# Patient Record
Sex: FEMALE | Race: WHITE | NOT HISPANIC OR LATINO | Employment: FULL TIME
[De-identification: names, ages, dates, MRNs, and addresses within clinical notes are randomized per-mention and may not be internally consistent; named-entity substitution may affect disease eponyms.]

---

## 2023-08-02 ENCOUNTER — OFFICE VISIT (OUTPATIENT)
Dept: BEHAVIORAL HEALTH | Facility: PSYCHIATRIC FACILITY | Age: 38
End: 2023-08-02
Payer: COMMERCIAL

## 2023-08-02 VITALS
HEIGHT: 64 IN | OXYGEN SATURATION: 95 % | WEIGHT: 124 LBS | DIASTOLIC BLOOD PRESSURE: 82 MMHG | BODY MASS INDEX: 21.17 KG/M2 | HEART RATE: 94 BPM | SYSTOLIC BLOOD PRESSURE: 119 MMHG

## 2023-08-02 DIAGNOSIS — F19.94 SUBSTANCE INDUCED MOOD DISORDER (HCC): ICD-10-CM

## 2023-08-02 DIAGNOSIS — F19.10 POLYSUBSTANCE ABUSE (HCC): ICD-10-CM

## 2023-08-02 DIAGNOSIS — F41.1 GAD (GENERALIZED ANXIETY DISORDER): ICD-10-CM

## 2023-08-02 PROCEDURE — 3079F DIAST BP 80-89 MM HG: CPT | Performed by: STUDENT IN AN ORGANIZED HEALTH CARE EDUCATION/TRAINING PROGRAM

## 2023-08-02 PROCEDURE — 90792 PSYCH DIAG EVAL W/MED SRVCS: CPT | Mod: GC | Performed by: STUDENT IN AN ORGANIZED HEALTH CARE EDUCATION/TRAINING PROGRAM

## 2023-08-02 PROCEDURE — 3074F SYST BP LT 130 MM HG: CPT | Performed by: STUDENT IN AN ORGANIZED HEALTH CARE EDUCATION/TRAINING PROGRAM

## 2023-08-02 RX ORDER — GABAPENTIN 600 MG/1
600 TABLET ORAL 3 TIMES DAILY
Qty: 90 TABLET | Refills: 3 | Status: SHIPPED | OUTPATIENT
Start: 2023-08-02 | End: 2023-11-22

## 2023-08-02 RX ORDER — LURASIDONE HYDROCHLORIDE 40 MG/1
40 TABLET, FILM COATED ORAL
Qty: 30 TABLET | Refills: 3 | Status: SHIPPED | OUTPATIENT
Start: 2023-08-02 | End: 2023-09-20

## 2023-08-02 RX ORDER — HYDROXYZINE 50 MG/1
50 TABLET, FILM COATED ORAL
Qty: 30 TABLET | Refills: 3 | Status: SHIPPED | OUTPATIENT
Start: 2023-08-02 | End: 2023-09-01

## 2023-08-02 ASSESSMENT — ENCOUNTER SYMPTOMS
PARANOIA: 0
NERVOUS/ANXIOUS: 0
HYPERVIGILANCE: 0
WEAKNESS: 0
FEVER: 0
CHILLS: 0
DIFFICULTY FALLING ASLEEP: 0
PREOCCUPATION: 0
SHORTNESS OF BREATH: 0
PANIC: 0
AGGRESSION: 0
INSOMNIA: 0
HOPELESSNESS: 0
DELUSIONS: 0
VOMITING: 0
COMPULSIONS: 0
HEARTBURN: 0
OBSESSIVE COMPULSIVE BEHAVIOR: 0
DEPRESSION: 0
COUGH: 0
THOUGHT CONTENT - EXCESSIVE UNREASONABLE FEAR: 1
RECENT WEIGHT LOSS: 0
HALLUCINATIONS: 0
THOUGHT CONTENT - SHAME: 0
WEIGHT GAIN: 0
CONSTIPATION: 0
NAUSEA: 0
DIARRHEA: 0
DECREASED APPETITE: 0
RESTLESS SLEEP: 0
DECREASED ENERGY: 0

## 2023-08-02 ASSESSMENT — LIFESTYLE VARIABLES: SUBSTANCE_ABUSE: 0
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
## 2023-08-08 ENCOUNTER — OFFICE VISIT (OUTPATIENT)
Dept: URGENT CARE | Facility: CLINIC | Age: 38
End: 2023-08-08
Payer: COMMERCIAL

## 2023-08-08 ENCOUNTER — APPOINTMENT (OUTPATIENT)
Dept: URGENT CARE | Facility: CLINIC | Age: 38
End: 2023-08-08

## 2023-08-08 VITALS
RESPIRATION RATE: 20 BRPM | WEIGHT: 125 LBS | BODY MASS INDEX: 21.34 KG/M2 | TEMPERATURE: 98.6 F | HEIGHT: 64 IN | DIASTOLIC BLOOD PRESSURE: 72 MMHG | OXYGEN SATURATION: 98 % | HEART RATE: 96 BPM | SYSTOLIC BLOOD PRESSURE: 114 MMHG

## 2023-08-08 DIAGNOSIS — J98.01 BRONCHOSPASM, ACUTE: ICD-10-CM

## 2023-08-08 DIAGNOSIS — R05.1 ACUTE COUGH: ICD-10-CM

## 2023-08-08 PROCEDURE — 3078F DIAST BP <80 MM HG: CPT | Performed by: PHYSICIAN ASSISTANT

## 2023-08-08 PROCEDURE — 99203 OFFICE O/P NEW LOW 30 MIN: CPT | Performed by: PHYSICIAN ASSISTANT

## 2023-08-08 PROCEDURE — 3074F SYST BP LT 130 MM HG: CPT | Performed by: PHYSICIAN ASSISTANT

## 2023-08-08 RX ORDER — LEVONORGESTREL 13.5 MG/1
INTRAUTERINE DEVICE INTRAUTERINE
COMMUNITY

## 2023-08-08 RX ORDER — ALBUTEROL SULFATE 90 UG/1
2 AEROSOL, METERED RESPIRATORY (INHALATION) EVERY 6 HOURS PRN
Qty: 8.5 G | Refills: 0 | Status: SHIPPED | OUTPATIENT
Start: 2023-08-08

## 2023-08-08 RX ORDER — PREDNISONE 20 MG/1
40 TABLET ORAL DAILY
Qty: 10 TABLET | Refills: 0 | Status: SHIPPED | OUTPATIENT
Start: 2023-08-08 | End: 2023-08-13

## 2023-08-08 RX ORDER — ONDANSETRON 4 MG/1
TABLET, ORALLY DISINTEGRATING ORAL
COMMUNITY
Start: 2023-07-28

## 2023-08-08 RX ORDER — DEXTROMETHORPHAN HYDROBROMIDE AND PROMETHAZINE HYDROCHLORIDE 15; 6.25 MG/5ML; MG/5ML
5 SYRUP ORAL EVERY 4 HOURS PRN
Qty: 120 ML | Refills: 0 | Status: SHIPPED | OUTPATIENT
Start: 2023-08-08

## 2023-08-08 NOTE — PROGRESS NOTES
"Subjective:   Tonya Hillman is a 37 y.o. female who presents for Cough (X4 days, wheezy cough, SOB, took a Covid test yesterday: Negative)      HPI  The patient presents to the Urgent Care with complaints of cough and wheezing onset 4 days ago.  Sometimes productive.  No history of asthma.  Associated nasal congestion and mild shortness of breath.  Mucinex DM this morning without much difference.  Denies any fever, sore throat, vomiting, diarrhea.   Negative COVID test yesterday. Does not feel like COVID.   Patient recently moved here and new to the area last month.          History reviewed. No pertinent past medical history.  No Known Allergies     Objective:     /72 (BP Location: Left arm, Patient Position: Sitting, BP Cuff Size: Adult)   Pulse 96   Temp 37 °C (98.6 °F) (Temporal)   Resp 20   Ht 1.626 m (5' 4\")   Wt 56.7 kg (125 lb)   SpO2 98%   BMI 21.46 kg/m²     Physical Exam  Vitals reviewed.   Constitutional:       General: She is not in acute distress.     Appearance: Normal appearance. She is not ill-appearing or toxic-appearing.   HENT:      Mouth/Throat:      Mouth: Mucous membranes are moist.      Pharynx: Uvula midline. Posterior oropharyngeal erythema present. No pharyngeal swelling, oropharyngeal exudate or uvula swelling.      Tonsils: No tonsillar exudate or tonsillar abscesses.   Eyes:      Conjunctiva/sclera: Conjunctivae normal.   Cardiovascular:      Rate and Rhythm: Normal rate and regular rhythm.      Heart sounds: Normal heart sounds.   Pulmonary:      Effort: Pulmonary effort is normal. No respiratory distress.      Breath sounds: Wheezing (mild expiratory to upper lung fields.) present. No rhonchi or rales.   Musculoskeletal:      Cervical back: Neck supple. No rigidity.   Skin:     General: Skin is warm and dry.   Neurological:      General: No focal deficit present.      Mental Status: She is alert and oriented to person, place, and time.   Psychiatric:         " Mood and Affect: Mood normal.         Behavior: Behavior normal.         Diagnosis and associated orders:     1. Bronchospasm, acute    - albuterol 108 (90 Base) MCG/ACT Aero Soln inhalation aerosol; Inhale 2 Puffs every 6 hours as needed for Shortness of Breath.  Dispense: 8.5 g; Refill: 0  - predniSONE (DELTASONE) 20 MG Tab; Take 2 Tablets by mouth every day for 5 days.  Dispense: 10 Tablet; Refill: 0  - promethazine-dextromethorphan (PROMETHAZINE-DM) 6.25-15 MG/5ML syrup; Take 5 mL by mouth every four hours as needed for Cough.  Dispense: 120 mL; Refill: 0    2. Acute cough    - promethazine-dextromethorphan (PROMETHAZINE-DM) 6.25-15 MG/5ML syrup; Take 5 mL by mouth every four hours as needed for Cough.  Dispense: 120 mL; Refill: 0       Comments/MDM:     Patient's presenting symptoms and exam findings are consistent with acute bronchospasm or reactive airway secondary to viral versus seasonal allergies.  Denies history of asthma.  She is well-appearing no acute distress.  Lung examination showed mild expiratory wheezes to upper lung fields.  No crackles or rhonchi.  Normal vital signs.  Will start treatment of albuterol inhaler and prednisone.  Take prednisone in the morning.  Cough medicine during the night.  Over-the-counter cough medicine during the day such as Robitussin DM or Mucinex.  Increase fluid intake, nasal saline washes, Flonase nasal spray, antihistamine such as Zyrtec or Claritin.       I personally reviewed prior external notes and test results pertinent to today's visit. Pathogenesis of diagnosis discussed including typical length and natural progression. Supportive care, natural history, differential diagnoses, and indications for immediate follow-up discussed. Patient expresses understanding and agrees to plan. Patient denies any other questions or concerns.     Follow-up with the primary care physician for recheck, reevaluation, and consideration of further management.    Please note that  this dictation was created using voice recognition software. I have made a reasonable attempt to correct obvious errors, but I expect that there are errors of grammar and possibly content that I did not discover before finalizing the note.    This note was electronically signed by Jordan Singleton PA-C

## 2023-09-20 ENCOUNTER — OFFICE VISIT (OUTPATIENT)
Dept: BEHAVIORAL HEALTH | Facility: PSYCHIATRIC FACILITY | Age: 38
End: 2023-09-20
Payer: COMMERCIAL

## 2023-09-20 VITALS
HEART RATE: 57 BPM | OXYGEN SATURATION: 94 % | HEIGHT: 64 IN | BODY MASS INDEX: 21.68 KG/M2 | DIASTOLIC BLOOD PRESSURE: 71 MMHG | WEIGHT: 127 LBS | SYSTOLIC BLOOD PRESSURE: 107 MMHG

## 2023-09-20 DIAGNOSIS — F19.94 SUBSTANCE INDUCED MOOD DISORDER (HCC): ICD-10-CM

## 2023-09-20 PROCEDURE — 99214 OFFICE O/P EST MOD 30 MIN: CPT | Mod: GC | Performed by: STUDENT IN AN ORGANIZED HEALTH CARE EDUCATION/TRAINING PROGRAM

## 2023-09-20 PROCEDURE — 3074F SYST BP LT 130 MM HG: CPT | Performed by: STUDENT IN AN ORGANIZED HEALTH CARE EDUCATION/TRAINING PROGRAM

## 2023-09-20 PROCEDURE — 3078F DIAST BP <80 MM HG: CPT | Performed by: STUDENT IN AN ORGANIZED HEALTH CARE EDUCATION/TRAINING PROGRAM

## 2023-09-20 RX ORDER — LURASIDONE HYDROCHLORIDE 20 MG/1
TABLET, FILM COATED ORAL
Qty: 18 TABLET | Refills: 0 | Status: SHIPPED | OUTPATIENT
Start: 2023-09-20 | End: 2023-10-11

## 2023-09-20 ASSESSMENT — ENCOUNTER SYMPTOMS
OBSESSIVE COMPULSIVE BEHAVIOR: 0
PREOCCUPATION: 0
HALLUCINATIONS: 0
HEARTBURN: 0
HOPELESSNESS: 0
CONSTIPATION: 0
FEVER: 0
AGGRESSION: 0
COUGH: 0
CHILLS: 0
INSOMNIA: 0
THOUGHT CONTENT - SHAME: 0
RECENT WEIGHT LOSS: 0
THOUGHT CONTENT - EXCESSIVE UNREASONABLE FEAR: 1
NAUSEA: 0
DEPRESSION: 0
DECREASED ENERGY: 0
DIARRHEA: 0
WEAKNESS: 0
PANIC: 0
PARANOIA: 0
NERVOUS/ANXIOUS: 0
DECREASED APPETITE: 0
DIFFICULTY FALLING ASLEEP: 0
RESTLESS SLEEP: 0
DELUSIONS: 0
WEIGHT GAIN: 0
HYPERVIGILANCE: 0
COMPULSIONS: 0
SHORTNESS OF BREATH: 0
VOMITING: 0

## 2023-09-20 ASSESSMENT — LIFESTYLE VARIABLES: SUBSTANCE_ABUSE: 0
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
## 2023-11-22 ENCOUNTER — OFFICE VISIT (OUTPATIENT)
Dept: BEHAVIORAL HEALTH | Facility: PSYCHIATRIC FACILITY | Age: 38
End: 2023-11-22
Payer: COMMERCIAL

## 2023-11-22 DIAGNOSIS — F19.94 SUBSTANCE INDUCED MOOD DISORDER (HCC): ICD-10-CM

## 2023-11-22 DIAGNOSIS — F41.1 GENERALIZED ANXIETY DISORDER: ICD-10-CM

## 2023-11-22 DIAGNOSIS — F41.1 GAD (GENERALIZED ANXIETY DISORDER): ICD-10-CM

## 2023-11-22 DIAGNOSIS — F19.90 POLYSUBSTANCE USE DISORDER: ICD-10-CM

## 2023-11-22 PROCEDURE — 99214 OFFICE O/P EST MOD 30 MIN: CPT | Mod: GC | Performed by: STUDENT IN AN ORGANIZED HEALTH CARE EDUCATION/TRAINING PROGRAM

## 2023-11-22 RX ORDER — GABAPENTIN 600 MG/1
900 TABLET ORAL
Qty: 135 TABLET | Refills: 1 | Status: SHIPPED | OUTPATIENT
Start: 2023-11-22 | End: 2024-05-20

## 2023-11-22 RX ORDER — HYDROXYZINE 50 MG/1
50 TABLET, FILM COATED ORAL
Qty: 90 TABLET | Refills: 1 | Status: SHIPPED | OUTPATIENT
Start: 2023-11-22 | End: 2024-05-20

## 2023-11-22 ASSESSMENT — ANXIETY QUESTIONNAIRES
7. FEELING AFRAID AS IF SOMETHING AWFUL MIGHT HAPPEN: SEVERAL DAYS
1. FEELING NERVOUS, ANXIOUS, OR ON EDGE: MORE THAN HALF THE DAYS
2. NOT BEING ABLE TO STOP OR CONTROL WORRYING: SEVERAL DAYS
GAD7 TOTAL SCORE: 10
5. BEING SO RESTLESS THAT IT IS HARD TO SIT STILL: SEVERAL DAYS
IF YOU CHECKED OFF ANY PROBLEMS ON THIS QUESTIONNAIRE, HOW DIFFICULT HAVE THESE PROBLEMS MADE IT FOR YOU TO DO YOUR WORK, TAKE CARE OF THINGS AT HOME, OR GET ALONG WITH OTHER PEOPLE: SOMEWHAT DIFFICULT
6. BECOMING EASILY ANNOYED OR IRRITABLE: MORE THAN HALF THE DAYS
4. TROUBLE RELAXING: MORE THAN HALF THE DAYS
3. WORRYING TOO MUCH ABOUT DIFFERENT THINGS: SEVERAL DAYS

## 2023-11-22 ASSESSMENT — PATIENT HEALTH QUESTIONNAIRE - PHQ9
CLINICAL INTERPRETATION OF PHQ2 SCORE: 0
5. POOR APPETITE OR OVEREATING: 0 - NOT AT ALL

## 2023-11-22 ASSESSMENT — ENCOUNTER SYMPTOMS
FEVER: 0
HEADACHES: 0
COUGH: 0
DEPRESSION: 0
WEAKNESS: 0
CHILLS: 0
HALLUCINATIONS: 0
NAUSEA: 0
NERVOUS/ANXIOUS: 0
DIZZINESS: 0
INSOMNIA: 0
SEIZURES: 0
VOMITING: 0

## 2023-11-22 ASSESSMENT — LIFESTYLE VARIABLES: SUBSTANCE_ABUSE: 0

## 2023-11-22 NOTE — PROGRESS NOTES
Evaluation completed by: Danay Choudhary D.O.   Date of Service: 11/22/23   Appointment type: in-office appointment.  Attending:  Latisha Rodríguez M.D.  Information below was collected from: patient    CHIEF COMPLIANT:  Medication Management (For Substance induced mood disorder, stable, Generalized anxiety disorder, stable, Polysubstance use disorder, severe, in partial remission, hx Postpartum psychotic sx, resolved )        HPI:   Tonya Hilmlan is a 38 y.o. old female who presents today for regularly scheduled follow up for assessment of Medication Management (For Substance induced mood disorder, stable, Generalized anxiety disorder, stable, Polysubstance use disorder, severe, in partial remission, hx Postpartum psychotic sx, resolved )    Pt's last visit was on 9/20/2023 at which time plan was to:  -DECREASE latuda 40mg to 20mg QPM with dinner with plan to d/c  -Continue hydroxyzine 50mg daily PRN  -Continue gabapentin 600mg TID PO for anxiety     Since last visit, pt has d/c'd latuda without issue or adverse effect.   She has also decreased the gabapentin she is on from 600mg TID to 900mg QHS. She has noticed some internal irritability arise since decreasing the medications. States she had one episode of increased anxiety on the plane, and recalls an odd thought of being worried about [place] a [place] but had no plans to go to a lake, but otherwise no other paranoia or anxiety.     Has been getting 7-9 hours of sleep/night, mood has otherwise been normal. Has been utilizing gabapentin and hydroxyzine prn qhs to assist with sleep.    States that she was on lexapro before had potential equivocal effect, wellbutrin didn't help. Spoke of adderall and vyvanse prior misuse and marijuana use. Spoke of different ADHD tx, including non stimulant and ritalin potential medications.   She has no plans of getting pregnant and continue using IUD, she will keep us updated if anything  changes.        CURRENT MEDICATIONS    Current Outpatient Medications:     hydrOXYzine HCl (ATARAX) 50 MG Tab, Take 1 Tablet by mouth at bedtime for 180 days., Disp: 90 Tablet, Rfl: 1    gabapentin (NEURONTIN) 600 MG tablet, Take 1.5 Tablets by mouth at bedtime for 180 days., Disp: 135 Tablet, Rfl: 1    tretinoin (RETIN-A) 0.1 % cream, APPLY A THIN LAYER TO THE AFFECTED AREA(S) NIGHTLY AT BEDTIME. START WITH EVERY OTHER NIGHT AND INCREASE TO NIGHTLY AS TOLERATED, Disp: , Rfl:     ondansetron (ZOFRAN ODT) 4 MG TABLET DISPERSIBLE, 1 TABLET 3 TIMES A DAY FOR 2 DAYS OR UNTIL DIRECTED TO STOP. TAKE ONLY AS NEEDED., Disp: , Rfl:     multivitamin Tab, Take 1 Tablet by mouth every day., Disp: , Rfl:     Levonorgestrel (LEWIS) 13.5 MG IUD, by Intrauterine route., Disp: , Rfl:     albuterol 108 (90 Base) MCG/ACT Aero Soln inhalation aerosol, Inhale 2 Puffs every 6 hours as needed for Shortness of Breath., Disp: 8.5 g, Rfl: 0    promethazine-dextromethorphan (PROMETHAZINE-DM) 6.25-15 MG/5ML syrup, Take 5 mL by mouth every four hours as needed for Cough., Disp: 120 mL, Rfl: 0    REVIEW OF SYSTEMS   Review of Systems   Constitutional:  Negative for chills and fever.   Respiratory:  Negative for cough.    Cardiovascular:  Negative for chest pain.   Gastrointestinal:  Negative for nausea and vomiting.   Neurological:  Negative for dizziness, seizures, weakness and headaches.   Psychiatric/Behavioral:  Negative for depression, hallucinations, substance abuse and suicidal ideas. The patient is not nervous/anxious and does not have insomnia.        PAST MEDICAL HISTORY  No past medical history on file.  No Known Allergies  No past surgical history on file.     PSYCHIATRIC EXAMINATION   Vitals: There were no vitals taken for this visit.  Musculoskeletal: No abnormal movements noted and wnl  Appearance: well-developed, well-nourished, appears stated age, fair hygiene, no apparent distress, thin, and appropriately dressed, cooperative,  "engaged, friendly, and pleasant  Thought Process:  linear, coherent, goal-oriented, and organized  Abnormal or Psychotic Thoughts: Denies SI, denies HI, and no overt delusions noted and No overt delusions noted  Speech: regular rate, rhythm, volume, tone, and syntax, coherent, and normal tone  Mood: \"good\"  Affect: euphoric and congruent with mood  SI/HI: Denies SI and HI  Orientation: alert and oriented  Recent and Remote Memory: no gross impairment in immediate, recent, or remote memory  Attention Span and Concentration:  Insight/Judgement into symptoms: good  Neurological Testing (MSSE Score and/or clock drawing): MMSE not performed during this encounter      SCREENINGS:      11/22/2023     9:00 AM   Depression Screen (PHQ-2/PHQ-9)   PHQ-2 Total Score 0         11/22/2023    10:11 AM    EVELYN-7 ANXIETY SCALE FLOWSHEET   Feeling nervous, anxious, or on edge 2   Not being able to stop or control worrying 1   Worrying too much about different things 1   Trouble relaxing 2   Being so restless that it is hard to sit still 1   Becoming easily annoyed or irritable 2   Feeling afraid as if something awful might happen 1   EVELYN-7 Total Score 10   How difficult have these problems made it for you to do your work, take care of things at home, or get along with other people? Somewhat difficult       LABS:  No results found for: \"CHOLSTRLTOT\", \"TRIGLYCERIDE\", \"HDL\", \"LDL\"  No results found for: \"SODIUM\", \"POTASSIUM\", \"CHLORIDE\", \"CO2\", \"ANION\", \"GLUCOSE\", \"BUN\", \"CREATININE\", \"CALCIUM\", \"ASTSGOT\", \"ALTSGPT\", \"TBILIRUBIN\", \"ALBUMIN\", \"TOTPROTEIN\", \"GLOBULIN\", \"AGRATIO\"  Lab Results   Component Value Date/Time    WBC 8.0 10/24/2022 10:16 AM    HEMOGLOBIN 13.7 10/24/2022 10:16 AM    HEMATOCRIT 39.4 10/24/2022 10:16 AM    MCV 93 10/24/2022 10:16 AM    MCH 32.2 10/24/2022 10:16 AM    MCHC 34.8 10/24/2022 10:16 AM    RDW 12.8 10/24/2022 10:16 AM    PLATELETCT 280 10/24/2022 10:16 AM    NEUTS 4.8 10/24/2022 10:16 AM    MONOS 0.6 " "10/24/2022 10:16 AM    EOS 0.1 10/24/2022 10:16 AM    BASO 0.0 10/24/2022 10:16 AM    NRBCAB 0.0 10/24/2022 10:16 AM     Lab Results   Component Value Date/Time    HBA1C 5.1 10/24/2022 1016    AVGLUC 100 10/24/2022 1016     No results found for: \"TSHULTRASEN\"  No results found for: \"FREET4\"  No results found for: \"25HYDROXY\"    PREVENTATIVE CARE         ASSESSMENT  Tonya Hillman is a 38 y.o. old female who presents today for regularly scheduled follow up for assessment of Medication Management (For Substance induced mood disorder, stable, Generalized anxiety disorder, stable, Polysubstance use disorder, severe, in partial remission, hx Postpartum psychotic sx, resolved )     Can consider stimulant for hx of ADHD, mood stabilizer/antidepressant/antianxiety or other medication class if irritability/anxiety worsens/persists. Pt wishes to be mindful of her substance use hx throughout.     Pt will be establishing with primary care, instructed to get thyroid panel in addition to other labs they will order.    [place]  records   reviewed.  No concerns about misuse of controlled substance.    CURRENT RISK ASSESSMENT       Suicide: Low       Homicide: Low       Self-Harm: Low       Relapse: Low       Crisis Safety Plan Reviewed Not Indicated    DIAGNOSES/PLAN  Problem List Items Addressed This Visit    None  Visit Diagnoses       Substance induced mood disorder (HCC)        -DISCONTINUED latuda    Generalized anxiety disorder        -Continue hydroxyzine 50mg daily PRN  -DECREASE gabapentin 600mg TID PO to 900mg QHS for anxiety (pt self decreased)    Relevant Medications    hydrOXYzine HCl (ATARAX) 50 MG Tab    Polysubstance use disorder        sevjeromere, in partial remission  -Continue AA, working with sponsor    EVELYN (generalized anxiety disorder)        Relevant Medications    hydrOXYzine HCl (ATARAX) 50 MG Tab    gabapentin (NEURONTIN) 600 MG tablet               Medication options, alternatives (including no " medications) and medication risks/benefits/side effects were discussed in detail.  The patient was advised to call, message clinician on [facility], or come in to the clinic if symptoms worsen or if questions/issues regarding their medications arise.  The patient verbalized understanding and agreement.    The patient was educated to call 911, call the suicide hotline, or go to the local ER if having thoughts of suicide or homicide.  The patient verbalized understanding and agreement.   The proposed treatment plan was discussed with the patient who was provided the opportunity to ask questions and make suggestions regarding alternative treatment. Patient verbalized understanding and expressed agreement with the plan.      Return in about 4 weeks (around 12/20/2023).      This appointment was supervised by attending psychiatrist, Latisha Rodríguez M.D., who agrees with assessment and treatment plan.  See attending attestation for more details.

## 2024-01-10 ENCOUNTER — OFFICE VISIT (OUTPATIENT)
Dept: BEHAVIORAL HEALTH | Facility: PSYCHIATRIC FACILITY | Age: 39
End: 2024-01-10
Payer: COMMERCIAL

## 2024-01-10 VITALS
HEART RATE: 66 BPM | DIASTOLIC BLOOD PRESSURE: 62 MMHG | WEIGHT: 122.4 LBS | HEIGHT: 64 IN | SYSTOLIC BLOOD PRESSURE: 110 MMHG | BODY MASS INDEX: 20.9 KG/M2 | OXYGEN SATURATION: 93 %

## 2024-01-10 DIAGNOSIS — F19.94 SUBSTANCE INDUCED MOOD DISORDER (HCC): ICD-10-CM

## 2024-01-10 DIAGNOSIS — F19.90 POLYSUBSTANCE USE DISORDER: ICD-10-CM

## 2024-01-10 DIAGNOSIS — F41.1 GENERALIZED ANXIETY DISORDER: ICD-10-CM

## 2024-01-10 DIAGNOSIS — F90.0 ADHD (ATTENTION DEFICIT HYPERACTIVITY DISORDER), INATTENTIVE TYPE: ICD-10-CM

## 2024-01-10 PROCEDURE — 99214 OFFICE O/P EST MOD 30 MIN: CPT | Mod: GC | Performed by: STUDENT IN AN ORGANIZED HEALTH CARE EDUCATION/TRAINING PROGRAM

## 2024-01-10 PROCEDURE — 3078F DIAST BP <80 MM HG: CPT | Performed by: STUDENT IN AN ORGANIZED HEALTH CARE EDUCATION/TRAINING PROGRAM

## 2024-01-10 PROCEDURE — 3074F SYST BP LT 130 MM HG: CPT | Performed by: STUDENT IN AN ORGANIZED HEALTH CARE EDUCATION/TRAINING PROGRAM

## 2024-01-10 RX ORDER — METHYLPHENIDATE HYDROCHLORIDE 20 MG/1
20 CAPSULE ORAL
Qty: 30 CAPSULE | Refills: 0 | Status: SHIPPED | OUTPATIENT
Start: 2024-01-10 | End: 2024-02-09

## 2024-01-10 ASSESSMENT — ENCOUNTER SYMPTOMS
SEIZURES: 0
VOMITING: 0
INSOMNIA: 0
HEADACHES: 0
DEPRESSION: 0
COUGH: 0
DIZZINESS: 0
HALLUCINATIONS: 0
CHILLS: 0
FEVER: 0
NERVOUS/ANXIOUS: 0
WEAKNESS: 0
NAUSEA: 0

## 2024-01-10 ASSESSMENT — LIFESTYLE VARIABLES: SUBSTANCE_ABUSE: 0

## 2024-01-10 NOTE — PROGRESS NOTES
Evaluation completed by: Danay Choudhary D.O.   Date of Service: 01/10/2024  Appointment type: in-office appointment.  Attending:  Latisha Rodríguez M.D.  Information below was collected from: patient    CHIEF COMPLIANT:  Medication Management      HPI:   Tonya Hillman is a 38 y.o. old female who presents today for regularly scheduled follow up for assessment of Medication Management    Pt's last visit was on 11/22/23 at which time plan was to:  -DISCONTINUED latuda   -Continue hydroxyzine 50mg daily PRN   -DECREASE gabapentin 600mg TID PO to 900mg QHS for anxiety (pt self decreased)   -Continue AA, working with sponsor  -Continue therapy    Pt reports being stable however continued difficulty with inattention, namely struggling to stay on task with fears of being potentially terminated. Reports that she is taking medications as prescribed, has discontinued latuda and decrease of gabapentin without adverse or notable effect (other than positive reflection not being sedated during the day). Denies SI, HI, AVH, paranoia, irritability. Reports good mood, denies sleep disturbances, denies substances/alcohol cravings. Continues to go to AA and therapy with good effect.     Patient is interested in starting ADHD medication as we have spoken about in previous appointments. Reviewed childhood history and robust adult coping mechanisms to allow her to compensate for ADHD symptoms of mostly inattentive nature. She has experienced numerous adverse events due to ADHD sx such as being passed up for a job, forgetting to pay bills, and having that impact her credit. 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     Reviewed patient's substance abuse hx and she re-iterates that she does not wish to go on IR nor Vyvanse because of abuse in on those medications in the past. Discussed several medication options, including  long acting medications. Attending also came in to speak with her about medication options and reviewed Jornay at length. Addressed patient's worry of previous theresa-like episodes which were specifically linked substances of hallucinogens/marijuana, patient is not engaging in any substances and reviewed again emergency options and ways of contacting the clinic in addition to further education about concerning symptoms that she can observe herself for if they arise.     Previously discussed very brief time in which she was post-partum and experienced some psychotic sx; she has no plans of getting pregnant and continue using IUD, she will keep us updated if anything changes.    CURRENT MEDICATIONS    Current Outpatient Medications:     hydrOXYzine HCl (ATARAX) 50 MG Tab, Take 1 Tablet by mouth at bedtime for 180 days., Disp: 90 Tablet, Rfl: 1    gabapentin (NEURONTIN) 600 MG tablet, Take 1.5 Tablets by mouth at bedtime for 180 days., Disp: 135 Tablet, Rfl: 1    tretinoin (RETIN-A) 0.1 % cream, APPLY A THIN LAYER TO THE AFFECTED AREA(S) NIGHTLY AT BEDTIME. START WITH EVERY OTHER NIGHT AND INCREASE TO NIGHTLY AS TOLERATED, Disp: , Rfl:     ondansetron (ZOFRAN ODT) 4 MG TABLET DISPERSIBLE, 1 TABLET 3 TIMES A DAY FOR 2 DAYS OR UNTIL DIRECTED TO STOP. TAKE ONLY AS NEEDED., Disp: , Rfl:     multivitamin Tab, Take 1 Tablet by mouth every day., Disp: , Rfl:     Levonorgestrel (LEWIS) 13.5 MG IUD, by Intrauterine route., Disp: , Rfl:     albuterol 108 (90 Base) MCG/ACT Aero Soln inhalation aerosol, Inhale 2 Puffs every 6 hours as needed for Shortness of Breath., Disp: 8.5 g, Rfl: 0    promethazine-dextromethorphan (PROMETHAZINE-DM) 6.25-15 MG/5ML syrup, Take 5 mL by mouth every four hours as needed for Cough., Disp: 120 mL, Rfl: 0    REVIEW OF SYSTEMS   Review of Systems   Constitutional:  Negative for chills and fever.   Respiratory:  Negative for cough.    Cardiovascular:  Negative for chest pain.   Gastrointestinal:   "Negative for nausea and vomiting.   Neurological:  Negative for dizziness, seizures, weakness and headaches.   Psychiatric/Behavioral:  Negative for depression, hallucinations, substance abuse and suicidal ideas. The patient is not nervous/anxious and does not have insomnia.        PAST MEDICAL HISTORY  No past medical history on file.  No Known Allergies  No past surgical history on file.     PSYCHIATRIC EXAMINATION   Vitals: /62 (BP Location: Right arm, Patient Position: Sitting, BP Cuff Size: Adult)   Pulse 66   Ht 1.626 m (5' 4\")   Wt 55.5 kg (122 lb 6.4 oz)   SpO2 93%   BMI 21.01 kg/m²   Musculoskeletal: No abnormal movements noted and wnl  Appearance: well-developed, well-nourished, appears stated age, fair hygiene, no apparent distress, thin, and appropriately dressed, cooperative, engaged, friendly, and pleasant  Thought Process:  linear, coherent, goal-oriented, and organized  Abnormal or Psychotic Thoughts: Denies SI, denies HI, and no overt delusions noted and No overt delusions noted  Speech: regular rate, rhythm, volume, tone, and syntax, coherent, and normal tone  Mood: \"good\"  Affect: euphoric and congruent with mood  SI/HI: Denies SI and HI  Orientation: alert and oriented  Recent and Remote Memory: no gross impairment in immediate, recent, or remote memory  Attention Span and Concentration:  Insight/Judgement into symptoms: good  Neurological Testing (MSSE Score and/or clock drawing): MMSE not performed during this encounter      SCREENINGS:      11/22/2023     9:00 AM   Depression Screen (PHQ-2/PHQ-9)   PHQ-2 Total Score 0         11/22/2023    10:11 AM    EVELYN-7 ANXIETY SCALE FLOWSHEET   Feeling nervous, anxious, or on edge 2   Not being able to stop or control worrying 1   Worrying too much about different things 1   Trouble relaxing 2   Being so restless that it is hard to sit still 1   Becoming easily annoyed or irritable 2   Feeling afraid as if something awful might happen 1 " "  EVELYN-7 Total Score 10   How difficult have these problems made it for you to do your work, take care of things at home, or get along with other people? Somewhat difficult       LABS:  No results found for: \"CHOLSTRLTOT\", \"TRIGLYCERIDE\", \"HDL\", \"LDL\"  No results found for: \"SODIUM\", \"POTASSIUM\", \"CHLORIDE\", \"CO2\", \"ANION\", \"GLUCOSE\", \"BUN\", \"CREATININE\", \"CALCIUM\", \"ASTSGOT\", \"ALTSGPT\", \"TBILIRUBIN\", \"ALBUMIN\", \"TOTPROTEIN\", \"GLOBULIN\", \"AGRATIO\"  Lab Results   Component Value Date/Time    WBC 8.0 10/24/2022 10:16 AM    HEMOGLOBIN 13.7 10/24/2022 10:16 AM    HEMATOCRIT 39.4 10/24/2022 10:16 AM    MCV 93 10/24/2022 10:16 AM    MCH 32.2 10/24/2022 10:16 AM    MCHC 34.8 10/24/2022 10:16 AM    RDW 12.8 10/24/2022 10:16 AM    PLATELETCT 280 10/24/2022 10:16 AM    NEUTS 4.8 10/24/2022 10:16 AM    MONOS 0.6 10/24/2022 10:16 AM    EOS 0.1 10/24/2022 10:16 AM    BASO 0.0 10/24/2022 10:16 AM    NRBCAB 0.0 10/24/2022 10:16 AM     Lab Results   Component Value Date/Time    HBA1C 5.1 10/24/2022 1016    AVGLUC 100 10/24/2022 1016     No results found for: \"TSHULTRASEN\"  No results found for: \"FREET4\"  No results found for: \"25HYDROXY\"    PREVENTATIVE CARE   N/A      ASSESSMENT  Tonya Hillman is a 38 y.o. old female who presents today for regularly scheduled follow up for assessment of Medication Management     [place]  records   reviewed.  No concerns about misuse of controlled substance.    CURRENT RISK ASSESSMENT       Suicide: Low       Homicide: Low       Self-Harm: Low       Relapse: Low       Crisis Safety Plan Reviewed Not Indicated    DIAGNOSES/PLAN  Problem List Items Addressed This Visit          Psychiatry Problems    Substance induced mood disorder (HCC)     Hx of postpartum psychotic sx (Pt has IUD)      Stable. Pt is doing well after tapering off latuda in conjunction with decreasing gabapentin.          Generalized anxiety disorder     In future visits depending on patient presentation can continue " tapering gabapentin due to potential lack of efficacy.     -Continue gabapentin 900mg QHS  -Continue hydroxyzine 50mg QHS         ADHD (attention deficit hyperactivity disorder), inattentive type     Patient has significant, debilitating ADHD symptoms of inattentive nature that she has been previously diagnosed and treated for prior that she would like to be medically addressed. Due to her severe history of polysubstance abuse leading to substance induced mood episodes and psychotic symptoms that have resulted in multiple inpatient psychiatric hospitalizations, patient cannot take any immediate release stimulants and cannot take Vyvanse due to history of abuse and abuse potential.   After discussing at length with patient regarding complex history of substance induced mood disorder, substance abuse history, medication options, side effects, and options to reach out for help including emergency and clinic numbers/my chart messages, will be starting Jornay trial today.     -START Jornay 20mg QHS for ADHD            Other    Polysubstance use disorder     In early remission, stable    Continue to encourage patient's attendance with AA and seeking preventative help               Medication options, alternatives (including no medications) and medication risks/benefits/side effects were discussed in detail.  The patient was advised to call, message clinician on [facility], or come in to the clinic if symptoms worsen or if questions/issues regarding their medications arise.  The patient verbalized understanding and agreement.    The patient was educated to call 911, call the suicide hotline, or go to the local ER if having thoughts of suicide or homicide.  The patient verbalized understanding and agreement.   The proposed treatment plan was discussed with the patient who was provided the opportunity to ask questions and make suggestions regarding alternative treatment. Patient verbalized understanding and expressed agreement  with the plan.      Return in about 4 weeks (around 2/7/2024).      This appointment was supervised by attending psychiatrist, Latisha Rodríguez M.D., who agrees with assessment and treatment plan.  See attending attestation for more details.

## 2024-01-10 NOTE — ASSESSMENT & PLAN NOTE
In early remission, stable    Continue to encourage patient's attendance with AA and seeking preventative help

## 2024-01-10 NOTE — ASSESSMENT & PLAN NOTE
Patient has significant, debilitating ADHD symptoms of inattentive nature that she has been previously diagnosed and treated for prior that she would like to be medically addressed. Due to her severe history of polysubstance abuse leading to substance induced mood episodes and psychotic symptoms that have resulted in multiple inpatient psychiatric hospitalizations, patient cannot take any immediate release stimulants and cannot take Vyvanse due to history of abuse and abuse potential.   After discussing at length with patient regarding complex history of substance induced mood disorder, substance abuse history, medication options, side effects, and options to reach out for help including emergency and clinic numbers/my chart messages, will be starting Jornay trial today.     -START Jornay 20mg QHS for ADHD

## 2024-01-10 NOTE — ASSESSMENT & PLAN NOTE
Hx of postpartum psychotic sx (Pt has IUD)      Stable. Pt is doing well after tapering off latuda in conjunction with decreasing gabapentin.

## 2024-01-10 NOTE — ASSESSMENT & PLAN NOTE
In future visits depending on patient presentation can continue tapering gabapentin due to potential lack of efficacy.     -Continue gabapentin 900mg QHS  -Continue hydroxyzine 50mg QHS

## 2024-02-07 ENCOUNTER — OFFICE VISIT (OUTPATIENT)
Dept: BEHAVIORAL HEALTH | Facility: PSYCHIATRIC FACILITY | Age: 39
End: 2024-02-07
Payer: COMMERCIAL

## 2024-02-07 VITALS
WEIGHT: 126 LBS | OXYGEN SATURATION: 96 % | SYSTOLIC BLOOD PRESSURE: 113 MMHG | HEIGHT: 64 IN | BODY MASS INDEX: 21.51 KG/M2 | DIASTOLIC BLOOD PRESSURE: 68 MMHG | HEART RATE: 80 BPM

## 2024-02-07 DIAGNOSIS — F41.1 GENERALIZED ANXIETY DISORDER: ICD-10-CM

## 2024-02-07 DIAGNOSIS — F19.94 SUBSTANCE INDUCED MOOD DISORDER (HCC): ICD-10-CM

## 2024-02-07 DIAGNOSIS — F90.0 ADHD (ATTENTION DEFICIT HYPERACTIVITY DISORDER), INATTENTIVE TYPE: ICD-10-CM

## 2024-02-07 PROCEDURE — 3078F DIAST BP <80 MM HG: CPT | Performed by: STUDENT IN AN ORGANIZED HEALTH CARE EDUCATION/TRAINING PROGRAM

## 2024-02-07 PROCEDURE — 3074F SYST BP LT 130 MM HG: CPT | Performed by: STUDENT IN AN ORGANIZED HEALTH CARE EDUCATION/TRAINING PROGRAM

## 2024-02-07 PROCEDURE — 99213 OFFICE O/P EST LOW 20 MIN: CPT | Mod: GE | Performed by: STUDENT IN AN ORGANIZED HEALTH CARE EDUCATION/TRAINING PROGRAM

## 2024-02-07 RX ORDER — METHYLPHENIDATE HYDROCHLORIDE 20 MG/1
20 CAPSULE ORAL DAILY
Qty: 30 CAPSULE | Refills: 0 | Status: SHIPPED | OUTPATIENT
Start: 2024-02-28 | End: 2024-03-13

## 2024-02-07 RX ORDER — METHYLPHENIDATE HYDROCHLORIDE 20 MG/1
20 CAPSULE ORAL DAILY
Qty: 30 CAPSULE | Refills: 0 | Status: SHIPPED | OUTPATIENT
Start: 2024-02-07 | End: 2024-03-08

## 2024-02-07 ASSESSMENT — ENCOUNTER SYMPTOMS
PALPITATIONS: 0
FEVER: 0
HEADACHES: 0
VOMITING: 0
CHILLS: 0
HALLUCINATIONS: 0
DIZZINESS: 0
DEPRESSION: 0
NAUSEA: 0
NERVOUS/ANXIOUS: 0
INSOMNIA: 0

## 2024-02-07 ASSESSMENT — LIFESTYLE VARIABLES: SUBSTANCE_ABUSE: 0

## 2024-02-07 NOTE — PROGRESS NOTES
"Evaluation completed by: Danay Choudhary D.O.   Date of Service: 02/07/2024  Appointment type: in-office appointment  Attending:  Dr. Latisha Rodríguez M.D.  Information below was collected from: Patient    CHIEF COMPLIANT:  Medication Management      HPI:   Tonya Hillman is a 38 y.o. old female who presents today for regularly scheduled follow up for assessment of Medication Management       Pt's last visit was on 01/10/2024 at which time plan was to:  -Continue gabapentin 900mg QHS  -Continue hydroxyzine 50mg QHS  -START Jornay 20mg QHS for ADHD       Pt reports she has had good effect with starting Jornay has of 2.5 weeks ago. She reports her inattentive symptoms decreased as she has been able to do well paying attention at work for ~2 hours while in the past she would not be able to sit at her computer, in addition to finding that it is easier to take notes after meetings and be present.  Denies medication side effects, denies sleep or appetite disturbances nor feelings of euphoria.       Reports that she has accidentally been taking her latuda for potentially the last 2 weeks due to it being the same pill bottle type and the pills looking \"exactly the same\" - discussed risks, benefits to d/c the latuda as she has not noticed any positive or negative symptoms since accidentally restarting that and further discussed safe guards such as the hydroxyzine and gabapentin in addition to stopping Jornay in addition to having this clinic as support in case she does become dysregulated.     Has been doing better at work and slowly gaining more confidence, no other interpersonal/social issues at this time.      PSYCHIATRIC REVIEW OF SYSTEMS: current symptoms as reported by patient  Depression: DENIES overt symptoms of depression such as marked depressed mood, anhedonia, sleep disturbances, Anhedonia, Low energy, Low appetite, and Suicidal ideation  Theresa: Denies overt symptoms of theresa such as " "marked elevated mood, distractability, irritability, grandiosity, flight of ideas   Anxiety/Panic Attacks: Denies overt symptoms of anxiety such as increased restlessness, panic attacks   PTSD symptom:  Patient reports no signs or symptoms indicative of PTSD   Psychosis: Patient reports no signs or symptoms indicative of psychosis    REVIEW OF SYSTEMS   Review of Systems   Constitutional:  Negative for chills and fever.   Cardiovascular:  Negative for chest pain and palpitations.   Gastrointestinal:  Negative for nausea and vomiting.   Neurological:  Negative for dizziness and headaches.   Psychiatric/Behavioral:  Negative for depression, hallucinations, substance abuse and suicidal ideas. The patient is not nervous/anxious and does not have insomnia.          PSYCHIATRIC EXAMINATION   Musculoskeletal: No abnormal movements noted and wnl  Appearance:  well-developed, well-nourished, appears stated age, fair hygiene, no apparent distress, and appropriately dressed, cooperative, engaged, friendly, and pleasant  Thought Process:   linear, coherent, goal-oriented, and organized  Abnormal or Psychotic Thoughts: Denies SI, denies HI, and no overt delusions noted and No overt delusions noted  SI/HI: Denies SI and HI  Speech: regular rate, rhythm, volume, tone, and syntax, coherent, and spontaneous  Mood: \"Good! Great!\"  Affect: euthymic to bright and congruent with mood  Orientation: alert and oriented  Recent and Remote Memory: no gross impairment in immediate, recent, or remote memory  Attention Span and Concentration: Intact  Insight/Judgement into symptoms: good  Neurological Testing (MSSE Score and/or clock drawing): MMSE not performed during this encounter  /68 (BP Location: Right arm, Patient Position: Sitting, BP Cuff Size: Adult)   Pulse 80   Ht 1.626 m (5' 4\")   Wt 57.2 kg (126 lb)   SpO2 96%   BMI 21.63 kg/m²     CURRENT MEDICATIONS    Current Outpatient Medications:     [START ON 2/28/2024] " Methylphenidate HCl ER, PM, (JORNAY PM) 20 MG CAPSULE SR 24 HR, Take 20 mg by mouth every day for 30 days., Disp: 30 Capsule, Rfl: 0    Methylphenidate HCl ER, PM, (JORNAY PM) 20 MG CAPSULE SR 24 HR, Take 20 mg by mouth every day for 30 days., Disp: 30 Capsule, Rfl: 0    Methylphenidate HCl ER, PM, (JORNAY PM) 20 MG CAPSULE SR 24 HR, Take 20 mg by mouth at bedtime for 30 days., Disp: 30 Capsule, Rfl: 0    hydrOXYzine HCl (ATARAX) 50 MG Tab, Take 1 Tablet by mouth at bedtime for 180 days., Disp: 90 Tablet, Rfl: 1    gabapentin (NEURONTIN) 600 MG tablet, Take 1.5 Tablets by mouth at bedtime for 180 days., Disp: 135 Tablet, Rfl: 1    tretinoin (RETIN-A) 0.1 % cream, APPLY A THIN LAYER TO THE AFFECTED AREA(S) NIGHTLY AT BEDTIME. START WITH EVERY OTHER NIGHT AND INCREASE TO NIGHTLY AS TOLERATED, Disp: , Rfl:     ondansetron (ZOFRAN ODT) 4 MG TABLET DISPERSIBLE, 1 TABLET 3 TIMES A DAY FOR 2 DAYS OR UNTIL DIRECTED TO STOP. TAKE ONLY AS NEEDED., Disp: , Rfl:     multivitamin Tab, Take 1 Tablet by mouth every day., Disp: , Rfl:     Levonorgestrel (LEWIS) 13.5 MG IUD, by Intrauterine route., Disp: , Rfl:     albuterol 108 (90 Base) MCG/ACT Aero Soln inhalation aerosol, Inhale 2 Puffs every 6 hours as needed for Shortness of Breath., Disp: 8.5 g, Rfl: 0    promethazine-dextromethorphan (PROMETHAZINE-DM) 6.25-15 MG/5ML syrup, Take 5 mL by mouth every four hours as needed for Cough., Disp: 120 mL, Rfl: 0    PAST MEDICAL HISTORY  No past medical history on file.  No Known Allergies  No past surgical history on file.     SCREENINGS:      11/22/2023     9:00 AM   Depression Screen (PHQ-2/PHQ-9)   PHQ-2 Total Score 0         11/22/2023    10:11 AM    EVELYN-7 ANXIETY SCALE FLOWSHEET   Feeling nervous, anxious, or on edge 2   Not being able to stop or control worrying 1   Worrying too much about different things 1   Trouble relaxing 2   Being so restless that it is hard to sit still 1   Becoming easily annoyed or irritable 2   Feeling  "afraid as if something awful might happen 1   EVELYN-7 Total Score 10   How difficult have these problems made it for you to do your work, take care of things at home, or get along with other people? Somewhat difficult       LABS:  No results found for: \"CHOLSTRLTOT\", \"TRIGLYCERIDE\", \"HDL\", \"LDL\"  No results found for: \"SODIUM\", \"POTASSIUM\", \"CHLORIDE\", \"CO2\", \"ANION\", \"GLUCOSE\", \"BUN\", \"CREATININE\", \"CALCIUM\", \"ASTSGOT\", \"ALTSGPT\", \"TBILIRUBIN\", \"ALBUMIN\", \"TOTPROTEIN\", \"GLOBULIN\", \"AGRATIO\"  Lab Results   Component Value Date/Time    WBC 8.0 10/24/2022 10:16 AM    HEMOGLOBIN 13.7 10/24/2022 10:16 AM    HEMATOCRIT 39.4 10/24/2022 10:16 AM    MCV 93 10/24/2022 10:16 AM    MCH 32.2 10/24/2022 10:16 AM    MCHC 34.8 10/24/2022 10:16 AM    RDW 12.8 10/24/2022 10:16 AM    PLATELETCT 280 10/24/2022 10:16 AM    NEUTS 4.8 10/24/2022 10:16 AM    MONOS 0.6 10/24/2022 10:16 AM    EOS 0.1 10/24/2022 10:16 AM    BASO 0.0 10/24/2022 10:16 AM    NRBCAB 0.0 10/24/2022 10:16 AM     Lab Results   Component Value Date/Time    HBA1C 5.1 10/24/2022 1016    AVGLUC 100 10/24/2022 1016     No results found for: \"TSHULTRASEN\"  No results found for: \"FREET4\"  No results found for: \"25HYDROXY\"    PREVENTATIVE CARE  Medication Monitoring: Stimulants: Reviewed height, weight, blood pressure, and pulse.  No concerns. Signed Controlled Substance Agreement. Given to  staff to fax into chart today.      ASSESSMENT  Tonya Hillman is a 38 y.o. old female who presents today for regularly scheduled follow up for assessment of Medication Management      [place]  records   reviewed.  No concerns about misuse of controlled substance.    CURRENT RISK ASSESSMENT       Suicide: Low       Homicide: Low       Self-Harm: Low       Relapse: Low       Crisis Safety Plan Reviewed: Not indicated    DIAGNOSES/PLAN  Problem List Items Addressed This Visit          Psychiatry Problems    Substance induced mood disorder (HCC)       Hx of postpartum " psychotic sx (Pt has IUD)     Pt instructed to discontinue latuda and observe herself as she has accidentally restarted it.            Generalized anxiety disorder     In future visits depending on patient presentation can continue tapering gabapentin due to potential lack of efficacy.      -Continue gabapentin 900mg QHS  -Continue hydroxyzine 50mg QHS         ADHD (attention deficit hyperactivity disorder), inattentive type     Patient has significant, debilitating ADHD symptoms of inattentive nature that she has been previously diagnosed and treated for prior that she would like to be medically addressed. Due to her severe history of polysubstance abuse leading to substance induced mood episodes and psychotic symptoms that have resulted in multiple inpatient psychiatric hospitalizations, patient cannot take any immediate release stimulants and cannot take Vyvanse due to history of abuse and abuse potential.   After discussing at length with patient regarding complex history of substance induced mood disorder, substance abuse history, medication options, side effects, and options to reach out for help including emergency and clinic numbers/my chart messages.    Will be continuing on current dosage today due to patient's level of comfort and caution against substance misuse.       -CONTINUE Jornay 20mg QHS for ADHD         Relevant Medications    Methylphenidate HCl ER, PM, (JORNAY PM) 20 MG CAPSULE SR 24 HR (Start on 2/28/2024)    Methylphenidate HCl ER, PM, (JORNAY PM) 20 MG CAPSULE SR 24 HR    Other Relevant Orders    Controlled Substance Treatment Agreement          Medication options, alternatives (including no medications) and medication risks/benefits/side effects were discussed in detail.  The patient was advised to call, message clinician on [employer], or come in to the clinic if symptoms worsen or if questions/issues regarding their medications arise.  The patient verbalized understanding and agreement.     The patient was educated to call 911, call the suicide hotline, or go to the local ER if having thoughts of suicide or homicide.  The patient verbalized understanding and agreement.   The proposed treatment plan was discussed with the patient who was provided the opportunity to ask questions and make suggestions regarding alternative treatment. Patient verbalized understanding and expressed agreement with the plan.      Follow up in 1 month or sooner as needed.    This appointment was supervised by attending psychiatrist, Dr. Latisha Rodríguez M.D., who agrees with assessment and treatment plan.  See attending attestation for more details.

## 2024-02-07 NOTE — ASSESSMENT & PLAN NOTE
Hx of postpartum psychotic sx (Pt has IUD)       Pt instructed to discontinue latuda and observe herself as she has accidentally restarted it.

## 2024-02-07 NOTE — ASSESSMENT & PLAN NOTE
Patient has significant, debilitating ADHD symptoms of inattentive nature that she has been previously diagnosed and treated for prior that she would like to be medically addressed. Due to her severe history of polysubstance abuse leading to substance induced mood episodes and psychotic symptoms that have resulted in multiple inpatient psychiatric hospitalizations, patient cannot take any immediate release stimulants and cannot take Vyvanse due to history of abuse and abuse potential.   After discussing at length with patient regarding complex history of substance induced mood disorder, substance abuse history, medication options, side effects, and options to reach out for help including emergency and clinic numbers/my chart messages.    Will be continuing on current dosage today due to patient's level of comfort and caution against substance misuse.       -CONTINUE Jornay 20mg QHS for ADHD

## 2024-03-13 ENCOUNTER — OFFICE VISIT (OUTPATIENT)
Dept: BEHAVIORAL HEALTH | Facility: PSYCHIATRIC FACILITY | Age: 39
End: 2024-03-13
Payer: COMMERCIAL

## 2024-03-13 VITALS
BODY MASS INDEX: 21 KG/M2 | HEART RATE: 85 BPM | HEIGHT: 64 IN | SYSTOLIC BLOOD PRESSURE: 122 MMHG | DIASTOLIC BLOOD PRESSURE: 81 MMHG | OXYGEN SATURATION: 96 % | WEIGHT: 123 LBS

## 2024-03-13 DIAGNOSIS — F19.94 SUBSTANCE INDUCED MOOD DISORDER (HCC): ICD-10-CM

## 2024-03-13 DIAGNOSIS — F90.0 ADHD (ATTENTION DEFICIT HYPERACTIVITY DISORDER), INATTENTIVE TYPE: ICD-10-CM

## 2024-03-13 DIAGNOSIS — F41.1 GENERALIZED ANXIETY DISORDER: ICD-10-CM

## 2024-03-13 PROCEDURE — 3079F DIAST BP 80-89 MM HG: CPT | Performed by: STUDENT IN AN ORGANIZED HEALTH CARE EDUCATION/TRAINING PROGRAM

## 2024-03-13 PROCEDURE — 99213 OFFICE O/P EST LOW 20 MIN: CPT | Mod: GE | Performed by: STUDENT IN AN ORGANIZED HEALTH CARE EDUCATION/TRAINING PROGRAM

## 2024-03-13 PROCEDURE — 3074F SYST BP LT 130 MM HG: CPT | Performed by: STUDENT IN AN ORGANIZED HEALTH CARE EDUCATION/TRAINING PROGRAM

## 2024-03-13 RX ORDER — METHYLPHENIDATE HYDROCHLORIDE 40 MG/1
40 CAPSULE ORAL
Qty: 30 CAPSULE | Refills: 0 | Status: SHIPPED | OUTPATIENT
Start: 2024-03-13 | End: 2024-04-12

## 2024-03-13 ASSESSMENT — ENCOUNTER SYMPTOMS
CHILLS: 0
VOMITING: 0
NERVOUS/ANXIOUS: 0
DIZZINESS: 0
HALLUCINATIONS: 0
FEVER: 0
NAUSEA: 0
INSOMNIA: 0
PALPITATIONS: 0
HEADACHES: 0
DEPRESSION: 0

## 2024-03-13 ASSESSMENT — LIFESTYLE VARIABLES: SUBSTANCE_ABUSE: 0

## 2024-03-13 NOTE — ASSESSMENT & PLAN NOTE
Plan to taper gabapentin in light of improving anxiety and in addition to observe clearer picture of what impact stimulants may be having to treat ADHD     -DECREASE gabapentin 900mg to 600mg QHS  -Continue hydroxyzine 50mg QHS PRN

## 2024-03-13 NOTE — PROGRESS NOTES
"Evaluation completed by: Danay Choudhary D.O.   Date of Service: 03/13/2024  Appointment type: in-office appointment  Attending:  Dr. Latisha Rodríguez M.D.  Information below was collected from: Patient    CHIEF COMPLIANT:  Medication Management      HPI:   Tonya Hillman is a 38 y.o. old female who presents today for regularly scheduled follow up for assessment of Medication Management       Pt's last visit was on 02/07/2024 at which time plan was to:  -Continue gabapentin 900mg QHS  -Continue hydroxyzine 50mg QHS  -Continue Jornay 20mg QHS for ADHD       Patient reflects that potential equivocal effect of Jornay may be subjectively   Reports that anxiety has improved and that \"something is going in the right direction\". States that she has seen good impact on staying focused and on task at work, however has some difficulty in the afternoon. She has been feeling a noticeable dip in energy around noon everyday. Takes Jornay @830pm every night, and drinks 2 cups of coffee in the morning celcius at night.     Stated first few nights she started taking it she started experiencing diaphoresis in the night that was manageable and has resolved.    Denies decreased appetite,  headaches, or any other s/e of stimulant and discussed decreasing gabapentin. Instructed to have a closer observation of energy level, mood, caffeine intake, and exercise as Jornay gets titrated.      PSYCHIATRIC REVIEW OF SYSTEMS: current symptoms as reported by patient  Depression: DENIES overt symptoms of depression such as marked depressed mood, anhedonia, sleep disturbances, Anhedonia, Low energy, Low appetite, and Suicidal ideation  Theresa: Denies overt symptoms of theresa such as marked elevated mood, distractability, irritability, grandiosity, flight of ideas   Anxiety/Panic Attacks: Denies overt symptoms of anxiety such as increased restlessness, panic attacks   PTSD symptom:  Patient reports no signs or symptoms " "indicative of PTSD   Psychosis: Patient reports no signs or symptoms indicative of psychosis    REVIEW OF SYSTEMS   Review of Systems   Constitutional:  Negative for chills and fever.   Cardiovascular:  Negative for chest pain and palpitations.   Gastrointestinal:  Negative for nausea and vomiting.   Neurological:  Negative for dizziness and headaches.   Psychiatric/Behavioral:  Negative for depression, hallucinations, substance abuse and suicidal ideas. The patient is not nervous/anxious and does not have insomnia.          PSYCHIATRIC EXAMINATION   Musculoskeletal: No abnormal movements noted and wnl  Appearance:  well-developed, well-nourished, appears stated age, fair hygiene, no apparent distress, and appropriately dressed, cooperative, engaged, friendly, and pleasant  Thought Process:   linear, coherent, goal-oriented, and organized  Abnormal or Psychotic Thoughts: Denies SI, denies HI, and no overt delusions noted and No overt delusions noted  SI/HI: Denies SI and HI  Speech: regular rate, rhythm, volume, tone, and syntax, coherent, and spontaneous  Mood: \"Fine!\"  Affect: euthymic to bright and congruent with mood  Orientation: alert and oriented  Recent and Remote Memory: no gross impairment in immediate, recent, or remote memory  Attention Span and Concentration: Intact  Insight/Judgement into symptoms: good  Neurological Testing (MSSE Score and/or clock drawing): MMSE not performed during this encounter  /81 (BP Location: Right arm, Patient Position: Sitting, BP Cuff Size: Adult)   Pulse 85   Ht 1.626 m (5' 4\")   Wt 55.8 kg (123 lb)   SpO2 96%   BMI 21.11 kg/m²     CURRENT MEDICATIONS    Current Outpatient Medications:     hydrOXYzine HCl (ATARAX) 50 MG Tab, Take 1 Tablet by mouth at bedtime for 180 days., Disp: 90 Tablet, Rfl: 1    gabapentin (NEURONTIN) 600 MG tablet, Take 1.5 Tablets by mouth at bedtime for 180 days., Disp: 135 Tablet, Rfl: 1    tretinoin (RETIN-A) 0.1 % cream, APPLY A THIN " "LAYER TO THE AFFECTED AREA(S) NIGHTLY AT BEDTIME. START WITH EVERY OTHER NIGHT AND INCREASE TO NIGHTLY AS TOLERATED, Disp: , Rfl:     ondansetron (ZOFRAN ODT) 4 MG TABLET DISPERSIBLE, 1 TABLET 3 TIMES A DAY FOR 2 DAYS OR UNTIL DIRECTED TO STOP. TAKE ONLY AS NEEDED., Disp: , Rfl:     multivitamin Tab, Take 1 Tablet by mouth every day., Disp: , Rfl:     Levonorgestrel (LEWIS) 13.5 MG IUD, by Intrauterine route., Disp: , Rfl:     albuterol 108 (90 Base) MCG/ACT Aero Soln inhalation aerosol, Inhale 2 Puffs every 6 hours as needed for Shortness of Breath., Disp: 8.5 g, Rfl: 0    promethazine-dextromethorphan (PROMETHAZINE-DM) 6.25-15 MG/5ML syrup, Take 5 mL by mouth every four hours as needed for Cough., Disp: 120 mL, Rfl: 0    PAST MEDICAL HISTORY  No past medical history on file.  No Known Allergies  No past surgical history on file.     SCREENINGS:      11/22/2023     9:00 AM   Depression Screen (PHQ-2/PHQ-9)   PHQ-2 Total Score 0         11/22/2023    10:11 AM    EVELYN-7 ANXIETY SCALE FLOWSHEET   Feeling nervous, anxious, or on edge 2   Not being able to stop or control worrying 1   Worrying too much about different things 1   Trouble relaxing 2   Being so restless that it is hard to sit still 1   Becoming easily annoyed or irritable 2   Feeling afraid as if something awful might happen 1   EVELYN-7 Total Score 10   How difficult have these problems made it for you to do your work, take care of things at home, or get along with other people? Somewhat difficult       LABS:  No results found for: \"CHOLSTRLTOT\", \"TRIGLYCERIDE\", \"HDL\", \"LDL\"  No results found for: \"SODIUM\", \"POTASSIUM\", \"CHLORIDE\", \"CO2\", \"ANION\", \"GLUCOSE\", \"BUN\", \"CREATININE\", \"CALCIUM\", \"ASTSGOT\", \"ALTSGPT\", \"TBILIRUBIN\", \"ALBUMIN\", \"TOTPROTEIN\", \"GLOBULIN\", \"AGRATIO\"  Lab Results   Component Value Date/Time    WBC 8.0 10/24/2022 10:16 AM    HEMOGLOBIN 13.7 10/24/2022 10:16 AM    HEMATOCRIT 39.4 10/24/2022 10:16 AM    MCV 93 10/24/2022 10:16 AM    MCH " "32.2 10/24/2022 10:16 AM    MCHC 34.8 10/24/2022 10:16 AM    RDW 12.8 10/24/2022 10:16 AM    PLATELETCT 280 10/24/2022 10:16 AM    NEUTS 4.8 10/24/2022 10:16 AM    MONOS 0.6 10/24/2022 10:16 AM    EOS 0.1 10/24/2022 10:16 AM    BASO 0.0 10/24/2022 10:16 AM    NRBCAB 0.0 10/24/2022 10:16 AM     Lab Results   Component Value Date/Time    HBA1C 5.1 10/24/2022 1016    AVGLUC 100 10/24/2022 1016     No results found for: \"TSHULTRASEN\"  No results found for: \"FREET4\"  No results found for: \"25HYDROXY\"    PREVENTATIVE CARE  Medication Monitoring: Stimulants: Reviewed height, weight, blood pressure, and pulse.  No concerns. Signed Controlled Substance Agreement. Given to  staff to fax into chart today.      ASSESSMENT  Tonya Hillman is a 38 y.o. old female who presents today for regularly scheduled follow up for assessment of Medication Management  See below for details    [place]  records   reviewed.  No concerns about misuse of controlled substance.    CURRENT RISK ASSESSMENT       Suicide: Low       Homicide: Low       Self-Harm: Low       Relapse: Low       Crisis Safety Plan Reviewed: Not indicated    DIAGNOSES/PLAN  Problem List Items Addressed This Visit          Psychiatry Problems    Substance induced mood disorder (HCC)     Resolved, stable    Hx of postpartum psychotic sx (Pt has IUD)               Generalized anxiety disorder     Plan to taper gabapentin in light of improving anxiety and in addition to observe clearer picture of what impact stimulants may be having to treat ADHD     -DECREASE gabapentin 900mg to 600mg QHS  -Continue hydroxyzine 50mg QHS PRN         ADHD (attention deficit hyperactivity disorder), inattentive type       Patient's significant, debilitating ADHD symptoms of inattentive nature that she has been previously diagnosed and treated for prior has been improving on trialing of Jornay. Will be increasing medication today and continue to monitor pt's level of comfort " and caution against substance misuse.        Due to her severe history of polysubstance abuse leading to substance induced mood episodes and psychotic symptoms that have resulted in multiple inpatient psychiatric hospitalizations, patient cannot take any immediate release stimulants and cannot take Vyvanse due to history of abuse and abuse potential. Continuing to discuss and education patient regarding complex history of substance induced mood disorder, substance abuse history, medication options, side effects, and options to reach out for help including emergency and clinic numbers/my chart messages.        -INCREASE Jornay 20mg to 40mg QHS for ADHD                     Medication options, alternatives (including no medications) and medication risks/benefits/side effects were discussed in detail.  The patient was advised to call, message clinician on [employer], or come in to the clinic if symptoms worsen or if questions/issues regarding their medications arise.  The patient verbalized understanding and agreement.    The patient was educated to call 911, call the suicide hotline, or go to the local ER if having thoughts of suicide or homicide.  The patient verbalized understanding and agreement.   The proposed treatment plan was discussed with the patient who was provided the opportunity to ask questions and make suggestions regarding alternative treatment. Patient verbalized understanding and expressed agreement with the plan.      Follow up in 1 month or sooner as needed.    This appointment was supervised by attending psychiatrist, Dr. Latisha Rodríguez M.D., who agrees with assessment and treatment plan.  See attending attestation for more details.

## 2024-03-13 NOTE — ASSESSMENT & PLAN NOTE
Patient's significant, debilitating ADHD symptoms of inattentive nature that she has been previously diagnosed and treated for prior has been improving on trialing of Jornay. Will be increasing medication today and continue to monitor pt's level of comfort and caution against substance misuse.        Due to her severe history of polysubstance abuse leading to substance induced mood episodes and psychotic symptoms that have resulted in multiple inpatient psychiatric hospitalizations, patient cannot take any immediate release stimulants and cannot take Vyvanse due to history of abuse and abuse potential. Continuing to discuss and education patient regarding complex history of substance induced mood disorder, substance abuse history, medication options, side effects, and options to reach out for help including emergency and clinic numbers/my chart messages.        -INCREASE Jornay 20mg to 40mg QHS for ADHD

## 2024-04-11 DIAGNOSIS — F90.0 ADHD (ATTENTION DEFICIT HYPERACTIVITY DISORDER), INATTENTIVE TYPE: Primary | ICD-10-CM

## 2024-04-15 RX ORDER — METHYLPHENIDATE HYDROCHLORIDE 40 MG/1
40 CAPSULE ORAL
Qty: 30 CAPSULE | Refills: 0 | Status: SHIPPED | OUTPATIENT
Start: 2024-04-15 | End: 2024-04-16

## 2024-04-16 DIAGNOSIS — F90.0 ADHD (ATTENTION DEFICIT HYPERACTIVITY DISORDER), INATTENTIVE TYPE: ICD-10-CM

## 2024-04-16 RX ORDER — METHYLPHENIDATE HYDROCHLORIDE 40 MG/1
1 CAPSULE ORAL
Qty: 30 CAPSULE | Refills: 0 | Status: SHIPPED | OUTPATIENT
Start: 2024-04-16 | End: 2024-05-16

## 2024-04-17 ENCOUNTER — OFFICE VISIT (OUTPATIENT)
Dept: BEHAVIORAL HEALTH | Facility: PSYCHIATRIC FACILITY | Age: 39
End: 2024-04-17
Payer: COMMERCIAL

## 2024-04-17 VITALS
OXYGEN SATURATION: 95 % | DIASTOLIC BLOOD PRESSURE: 78 MMHG | HEIGHT: 64 IN | WEIGHT: 118 LBS | HEART RATE: 75 BPM | BODY MASS INDEX: 20.14 KG/M2 | SYSTOLIC BLOOD PRESSURE: 112 MMHG

## 2024-04-17 DIAGNOSIS — F19.94 SUBSTANCE INDUCED MOOD DISORDER (HCC): ICD-10-CM

## 2024-04-17 DIAGNOSIS — F90.0 ADHD (ATTENTION DEFICIT HYPERACTIVITY DISORDER), INATTENTIVE TYPE: ICD-10-CM

## 2024-04-17 DIAGNOSIS — F41.1 GENERALIZED ANXIETY DISORDER: ICD-10-CM

## 2024-04-17 PROCEDURE — 3074F SYST BP LT 130 MM HG: CPT | Performed by: STUDENT IN AN ORGANIZED HEALTH CARE EDUCATION/TRAINING PROGRAM

## 2024-04-17 PROCEDURE — 3078F DIAST BP <80 MM HG: CPT | Performed by: STUDENT IN AN ORGANIZED HEALTH CARE EDUCATION/TRAINING PROGRAM

## 2024-04-17 PROCEDURE — 99214 OFFICE O/P EST MOD 30 MIN: CPT | Mod: GC | Performed by: STUDENT IN AN ORGANIZED HEALTH CARE EDUCATION/TRAINING PROGRAM

## 2024-04-17 RX ORDER — METHYLPHENIDATE HYDROCHLORIDE 40 MG/1
40 CAPSULE ORAL NIGHTLY
Qty: 30 CAPSULE | Refills: 0 | Status: SHIPPED | OUTPATIENT
Start: 2024-05-15 | End: 2024-04-29

## 2024-04-17 RX ORDER — METHYLPHENIDATE HYDROCHLORIDE 40 MG/1
40 CAPSULE ORAL NIGHTLY
Qty: 30 CAPSULE | Refills: 0 | Status: SHIPPED | OUTPATIENT
Start: 2024-06-12 | End: 2024-04-29

## 2024-04-17 ASSESSMENT — ENCOUNTER SYMPTOMS
NERVOUS/ANXIOUS: 0
DIZZINESS: 0
CHILLS: 0
INSOMNIA: 0
NAUSEA: 0
VOMITING: 0
PALPITATIONS: 0
HEADACHES: 0
COUGH: 0
FEVER: 0
HALLUCINATIONS: 0
DEPRESSION: 0

## 2024-04-17 ASSESSMENT — LIFESTYLE VARIABLES: SUBSTANCE_ABUSE: 0

## 2024-04-17 NOTE — PROGRESS NOTES
"Evaluation completed by: Danay Chouhdary D.O.   Date of Service: 04/17/2024  Appointment type: in-office appointment  Attending:  Dr. Latisha Rodríguez M.D.  Information below was collected from: Patient    CHIEF COMPLIANT:  Medication Management      HPI:   Tonya Hillman is a 38 y.o. old female who presents today for regularly scheduled follow up for assessment of Medication Management       Pt's last visit was on 03/13/2024 at which time plan was to:  -DECREASE gabapentin 900mg to 600mg QHS  -Continue hydroxyzine 50mg QHS PRN  -INCREASE Jornay 20mg to 40mg QHS for ADHD     Pt reports continued stability and that only difference is that she may have a more neutral affect in the last few days. Reflected the potential effect of increase Jornay but does not report this as a negative thing. Reports the increase of Jornay has thankfully led to an elimination of the \"crash at 12PM\" on the 20mg. Denies euphoria or any other substance cravings, denies substance use as well.     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 States that she is paying attention to details has vastly improved and is able to recall items from meetings earlier in the week which she could not have done before \"short term memory has drastically improved\". Takes Jornay @830pm consistently.    Pt has done well decreasing gabapentin 900mg to 600mg, states recently trialing one night of 300mg and for the past 2 nights had woken up briefly a few hours before she normally wakes up (3am and she normally wakes up 5~am) and is not sure at this point if correlated. Gave psychoeducation that patient can continue to decrease and have medications on board eventually on PRN basis more so as rescue which she is fearful of having another psychotic/manic episode that she experienced previously which were majorly contributed by substance use.     Denies " "decreased appetite, headaches, other s/e and is very thankful for this writer and their supervisor.      PSYCHIATRIC REVIEW OF SYSTEMS: current symptoms as reported by patient  Depression: Denies overt symptoms of depression such as marked depressed mood, anhedonia, sleep disturbances (minor/recent described in HPI), Anhedonia, Low energy, Low appetite, and Suicidal ideation  Jeanine: Denies overt symptoms of jeanine such as marked elevated mood, distractability, irritability, grandiosity, flight of ideas   Anxiety/Panic Attacks: Denies overt symptoms of anxiety such as increased restlessness, panic attacks   PTSD symptom:  Patient reports no signs or symptoms indicative of PTSD   Psychosis: Patient reports no signs or symptoms indicative of psychosis    REVIEW OF SYSTEMS   Review of Systems   Constitutional:  Negative for chills and fever.   Respiratory:  Negative for cough.    Cardiovascular:  Negative for chest pain and palpitations.   Gastrointestinal:  Negative for nausea and vomiting.   Neurological:  Negative for dizziness and headaches.   Psychiatric/Behavioral:  Negative for depression, hallucinations, substance abuse and suicidal ideas. The patient is not nervous/anxious and does not have insomnia.      PSYCHIATRIC EXAMINATION   Musculoskeletal: No abnormal movements noted and wnl  Appearance:  well-developed, well-nourished, appears stated age, fair hygiene, no apparent distress, and appropriately dressed, cooperative, engaged, friendly, and pleasant  Thought Process:   linear, coherent, goal-oriented, and organized  Abnormal or Psychotic Thoughts: Denies SI, denies HI, and no overt delusions noted and No overt delusions noted  SI/HI: Denies SI and HI  Speech: regular rate, rhythm, volume, tone, and syntax, coherent, and spontaneous  Mood: \"Great!\"  Affect: euthymic to bright, and congruent with mood  Orientation: alert and oriented  Recent and Remote Memory: no gross impairment in immediate, recent, or " "remote memory  Attention Span and Concentration: Intact  Insight/Judgement into symptoms: good  Neurological Testing (MSSE Score and/or clock drawing): MMSE not performed during this encounter  /78 (BP Location: Left arm, Patient Position: Sitting, BP Cuff Size: Adult)   Pulse 75   Ht 1.626 m (5' 4\")   Wt 53.5 kg (118 lb)   SpO2 95%   BMI 20.25 kg/m²     CURRENT MEDICATIONS    Current Outpatient Medications:     Methylphenidate HCl ER, PM, (JORNAY PM) 40 MG CAPSULE SR 24 HR, Take 1 Capsule by mouth at bedtime for 30 days., Disp: 30 Capsule, Rfl: 0    hydrOXYzine HCl (ATARAX) 50 MG Tab, Take 1 Tablet by mouth at bedtime for 180 days., Disp: 90 Tablet, Rfl: 1    gabapentin (NEURONTIN) 600 MG tablet, Take 1.5 Tablets by mouth at bedtime for 180 days., Disp: 135 Tablet, Rfl: 1    tretinoin (RETIN-A) 0.1 % cream, APPLY A THIN LAYER TO THE AFFECTED AREA(S) NIGHTLY AT BEDTIME. START WITH EVERY OTHER NIGHT AND INCREASE TO NIGHTLY AS TOLERATED, Disp: , Rfl:     ondansetron (ZOFRAN ODT) 4 MG TABLET DISPERSIBLE, 1 TABLET 3 TIMES A DAY FOR 2 DAYS OR UNTIL DIRECTED TO STOP. TAKE ONLY AS NEEDED., Disp: , Rfl:     multivitamin Tab, Take 1 Tablet by mouth every day., Disp: , Rfl:     Levonorgestrel (LEWIS) 13.5 MG IUD, by Intrauterine route., Disp: , Rfl:     albuterol 108 (90 Base) MCG/ACT Aero Soln inhalation aerosol, Inhale 2 Puffs every 6 hours as needed for Shortness of Breath., Disp: 8.5 g, Rfl: 0    promethazine-dextromethorphan (PROMETHAZINE-DM) 6.25-15 MG/5ML syrup, Take 5 mL by mouth every four hours as needed for Cough., Disp: 120 mL, Rfl: 0    PAST MEDICAL HISTORY  No past medical history on file.  No Known Allergies  No past surgical history on file.     SCREENINGS:      11/22/2023     9:00 AM   Depression Screen (PHQ-2/PHQ-9)   PHQ-2 Total Score 0         11/22/2023    10:11 AM    EVELYN-7 ANXIETY SCALE FLOWSHEET   Feeling nervous, anxious, or on edge 2   Not being able to stop or control worrying 1 " "  Worrying too much about different things 1   Trouble relaxing 2   Being so restless that it is hard to sit still 1   Becoming easily annoyed or irritable 2   Feeling afraid as if something awful might happen 1   EVELYN-7 Total Score 10   How difficult have these problems made it for you to do your work, take care of things at home, or get along with other people? Somewhat difficult       LABS:  No results found for: \"CHOLSTRLTOT\", \"TRIGLYCERIDE\", \"HDL\", \"LDL\"  No results found for: \"SODIUM\", \"POTASSIUM\", \"CHLORIDE\", \"CO2\", \"ANION\", \"GLUCOSE\", \"BUN\", \"CREATININE\", \"CALCIUM\", \"ASTSGOT\", \"ALTSGPT\", \"TBILIRUBIN\", \"ALBUMIN\", \"TOTPROTEIN\", \"GLOBULIN\", \"AGRATIO\"  Lab Results   Component Value Date/Time    WBC 8.0 10/24/2022 10:16 AM    HEMOGLOBIN 13.7 10/24/2022 10:16 AM    HEMATOCRIT 39.4 10/24/2022 10:16 AM    MCV 93 10/24/2022 10:16 AM    MCH 32.2 10/24/2022 10:16 AM    MCHC 34.8 10/24/2022 10:16 AM    RDW 12.8 10/24/2022 10:16 AM    PLATELETCT 280 10/24/2022 10:16 AM    NEUTS 4.8 10/24/2022 10:16 AM    MONOS 0.6 10/24/2022 10:16 AM    EOS 0.1 10/24/2022 10:16 AM    BASO 0.0 10/24/2022 10:16 AM    NRBCAB 0.0 10/24/2022 10:16 AM     Lab Results   Component Value Date/Time    HBA1C 5.1 10/24/2022 1016    AVGLUC 100 10/24/2022 1016     No results found for: \"TSHULTRASEN\"  No results found for: \"FREET4\"  No results found for: \"25HYDROXY\"    PREVENTATIVE CARE  Medication Monitoring: Stimulants: Reviewed height, weight, blood pressure, and pulse.  No concerns. Signed Controlled Substance Agreement.       ASSESSMENT  Tonya Hillman is a 38 y.o. old female who presents today for regularly scheduled follow up for assessment of Medication Management    See below for detailed plan    [place]  records   reviewed.  No concerns about misuse of controlled substance.    CURRENT RISK ASSESSMENT       Suicide: Low       Homicide: Low       Self-Harm: Low       Relapse: Low       Crisis Safety Plan Reviewed: Not " indicated    DIAGNOSES/PLAN  Problem List Items Addressed This Visit          Psychiatry Problems    Substance induced mood disorder (HCC)       Resolved, stable     Hx of postpartum psychotic sx (Pt has IUD, no current plans of getting pregnant in the future)                    Generalized anxiety disorder       Plan to slowly taper gabapentin in light of improving anxiety and in addition to observe clearer picture of what impact stimulants may be having to treat ADHD that may lead to continued decreased anxiety/insomnia.      -Continue gabapentin 600mg QHS  -Continue hydroxyzine 50mg QHS              ADHD (attention deficit hyperactivity disorder), inattentive type         Patient's significant, debilitating ADHD symptoms of inattentive nature that she has been previously diagnosed and treated for prior has been improving on trialing of Jornay and greatly improved since titration. We will keep current dose and continue to monitor pt's level of comfort level, cravings of substances, and caution against substance misuse - long delayed release of Jornay has certainly assisted with such.      Continuing to discuss and education patient regarding complex history of substance induced mood disorder, substance abuse history, medication options, side effects, and options to reach out for help including emergency and clinic numbers/my chart messages.        -CONTINUE Jornay 40mg QHS for ADHD                          Medication options, alternatives (including no medications) and medication risks/benefits/side effects were discussed in detail.  The patient was advised to call, message clinician on [email or web address], or come in to the clinic if symptoms worsen or if questions/issues regarding their medications arise.  The patient verbalized understanding and agreement.    The patient was educated to call 911, call the suicide hotline, or go to the local ER if having thoughts of suicide or homicide.  The patient verbalized understanding  and agreement.   The proposed treatment plan was discussed with the patient who was provided the opportunity to ask questions and make suggestions regarding alternative treatment. Patient verbalized understanding and expressed agreement with the plan.      Follow up in 1 month or sooner as needed.    This appointment was supervised by attending psychiatrist, Dr. Latisha Rodríguez M.D., who agrees with assessment and treatment plan.  See attending attestation for more details.

## 2024-04-17 NOTE — ASSESSMENT & PLAN NOTE
Resolved, stable     Hx of postpartum psychotic sx (Pt has IUD, no current plans of getting pregnant in the future)

## 2024-04-17 NOTE — ASSESSMENT & PLAN NOTE
Patient's significant, debilitating ADHD symptoms of inattentive nature that she has been previously diagnosed and treated for prior has been improving on trialing of Jornay and greatly improved since titration. We will keep current dose and continue to monitor pt's level of comfort level, cravings of substances, and caution against substance misuse - long delayed release of Jornay has certainly assisted with such.      Continuing to discuss and education patient regarding complex history of substance induced mood disorder, substance abuse history, medication options, side effects, and options to reach out for help including emergency and clinic numbers/my chart messages.        -CONTINUE Jornay 40mg QHS for ADHD

## 2024-04-17 NOTE — ASSESSMENT & PLAN NOTE
Plan to slowly taper gabapentin in light of improving anxiety and in addition to observe clearer picture of what impact stimulants may be having to treat ADHD that may lead to continued decreased anxiety/insomnia.      -Continue gabapentin 600mg QHS  -Continue hydroxyzine 50mg QHS

## 2024-04-29 ENCOUNTER — OFFICE VISIT (OUTPATIENT)
Dept: MEDICAL GROUP | Facility: IMAGING CENTER | Age: 39
End: 2024-04-29
Payer: COMMERCIAL

## 2024-04-29 VITALS
DIASTOLIC BLOOD PRESSURE: 70 MMHG | SYSTOLIC BLOOD PRESSURE: 108 MMHG | HEIGHT: 64 IN | TEMPERATURE: 98.8 F | HEART RATE: 77 BPM | BODY MASS INDEX: 19.92 KG/M2 | RESPIRATION RATE: 16 BRPM | WEIGHT: 116.7 LBS | OXYGEN SATURATION: 98 %

## 2024-04-29 DIAGNOSIS — Z11.59 NEED FOR HEPATITIS C SCREENING TEST: ICD-10-CM

## 2024-04-29 DIAGNOSIS — Z11.4 SCREENING FOR HIV (HUMAN IMMUNODEFICIENCY VIRUS): ICD-10-CM

## 2024-04-29 DIAGNOSIS — R09.81 NASAL CONGESTION: ICD-10-CM

## 2024-04-29 DIAGNOSIS — Z83.3 FAMILY HISTORY OF DIABETES MELLITUS: ICD-10-CM

## 2024-04-29 DIAGNOSIS — Z13.0 SCREENING FOR DISORDER OF BLOOD AND BLOOD-FORMING ORGANS: ICD-10-CM

## 2024-04-29 DIAGNOSIS — R05.1 ACUTE COUGH: ICD-10-CM

## 2024-04-29 DIAGNOSIS — Z13.29 SCREENING FOR THYROID DISORDER: ICD-10-CM

## 2024-04-29 DIAGNOSIS — Z13.220 SCREENING FOR LIPID DISORDERS: ICD-10-CM

## 2024-04-29 DIAGNOSIS — Z23 ENCOUNTER FOR ADMINISTRATION OF VACCINE: ICD-10-CM

## 2024-04-29 DIAGNOSIS — F41.1 GENERALIZED ANXIETY DISORDER: ICD-10-CM

## 2024-04-29 DIAGNOSIS — R06.2 WHEEZING: ICD-10-CM

## 2024-04-29 DIAGNOSIS — Z13.21 ENCOUNTER FOR VITAMIN DEFICIENCY SCREENING: ICD-10-CM

## 2024-04-29 DIAGNOSIS — F90.0 ADHD (ATTENTION DEFICIT HYPERACTIVITY DISORDER), INATTENTIVE TYPE: ICD-10-CM

## 2024-04-29 PROBLEM — F19.90 POLYSUBSTANCE USE DISORDER: Status: RESOLVED | Noted: 2024-01-10 | Resolved: 2024-04-29

## 2024-04-29 RX ORDER — ALBUTEROL SULFATE 90 UG/1
2 AEROSOL, METERED RESPIRATORY (INHALATION) EVERY 4 HOURS PRN
Qty: 1 EACH | Refills: 0 | Status: SHIPPED | OUTPATIENT
Start: 2024-04-29

## 2024-04-29 ASSESSMENT — PATIENT HEALTH QUESTIONNAIRE - PHQ9: CLINICAL INTERPRETATION OF PHQ2 SCORE: 0

## 2024-04-29 NOTE — PATIENT INSTRUCTIONS
Thank you for choosing [facility]. It was a pleasure meeting you today.     Take care!  Melissa MooreJefferson [facility]

## 2024-04-29 NOTE — PROGRESS NOTES
Subjective:       CC:   Chief Complaint   Patient presents with    Establish Care    URI     Cold, cough    Seasonal Allergies     Wants to test for allergies       HPI:   Tonya is a 38 y.o. female is new to me and is here today to establish care. Previous PCP was none.  Pt would like to discuss the following today:    Verbal consent was acquired by the patient to use [employer] ambient listening note generation during this visit Yes      History of Present Illness  The patient presents to establish care.    Anxiety/ADD:  The patient has been diagnosed with generalized anxiety disorder, Attention Deficit Disorder (ADD) for approximately 15 to 20 years, and substance abuse. She is currently under the care of a psychiatrist, Dr. Dupont, at [facility]. Her current medication regimen includes methylphenidate 40 mg, hydroxyzine 50 mg as needed, and gabapentin 600 mg  which she is in the process of tapering off. Gabapentin was prescribed for postpartum theresa and anxiety, and she was also prescribed a mood stabilizer for 2 to 3 months. She abstained from drugs for an extended period and maintained sobriety in 2013. However, she experienced a 3-month period of relapse in 2017 following a divorce from her first . She expresses a preference for non-prescription painkillers unless absolutely necessary.     IUD in place:  She has an intrauterine device (IUD) in place since 10/2023 and is scheduled for a follow-up on Friday. She is also scheduled for a Pap smear on Friday at [facility] with Sana Armenta PA-C.     URI:  The patient is currently experiencing cold symptoms, which she attributes to her son's recent illness from . Despite testing negative for COVID-19, she suspects she contracted a viral infection. This is the second occurrence of illness since she relocated to [place] in 06/2023. The first episode occurred last fall, and she was prescribed an albuterol inhaler at an urgent care facility.  She reports feeling wheezy for the first time in her lifetime, which started yesterday, and is concerned about potential allergies. She denies any history of allergies. She took Claritin-D yesterday, but did not take it today. She currently feels congested and experiences mild shortness of breath when taking a deep breath. She has been coughing up phlegm for the past 3 days and has tried honey for her cough. She also reports a sore throat. Denies history of Asthma.    The patient denies smoking, alcohol intake, or current drug use.   Her paternal grandfather had diabetes. Her parents are alive and do not have any disease. She denies any family history of heart disease.  Reports being up to date with hepatitis A and B.          ROS:   See HPI    Patient Active Problem List    Diagnosis Date Noted    Family history of diabetes mellitus 04/29/2024    Substance induced mood disorder (HCC) 01/10/2024    Generalized anxiety disorder 01/10/2024    ADHD (attention deficit hyperactivity disorder), inattentive type 01/10/2024       History reviewed. No pertinent past medical history.    Current Outpatient Medications   Medication Sig Dispense Refill    albuterol 108 (90 Base) MCG/ACT Aero Soln inhalation aerosol Inhale 2 Puffs every four hours as needed for Shortness of Breath. 1 Each 0    Methylphenidate HCl ER, PM, (JORNAY PM) 40 MG CAPSULE SR 24 HR Take 1 Capsule by mouth at bedtime for 30 days. 30 Capsule 0    hydrOXYzine HCl (ATARAX) 50 MG Tab Take 1 Tablet by mouth at bedtime for 180 days. 90 Tablet 1    gabapentin (NEURONTIN) 600 MG tablet Take 1.5 Tablets by mouth at bedtime for 180 days. 135 Tablet 1    tretinoin (RETIN-A) 0.1 % cream APPLY A THIN LAYER TO THE AFFECTED AREA(S) NIGHTLY AT BEDTIME. START WITH EVERY OTHER NIGHT AND INCREASE TO NIGHTLY AS TOLERATED      Levonorgestrel (LEWIS) 13.5 MG IUD by Intrauterine route.       No current facility-administered medications for this visit.       Allergies as of  "04/29/2024    (No Known Allergies)       Social History     Socioeconomic History    Marital status:      Spouse name: Not on file    Number of children: Not on file    Years of education: Not on file    Highest education level: Not on file   Occupational History    Not on file   Tobacco Use    Smoking status: Never    Smokeless tobacco: Never   Vaping Use    Vaping Use: Never used   Substance and Sexual Activity    Alcohol use: Not Currently    Drug use: Never    Sexual activity: Yes   Other Topics Concern    Not on file   Social History Narrative    Not on file     Social Determinants of Health     Financial Resource Strain: Not on file   Food Insecurity: Not on file   Transportation Needs: Not on file   Physical Activity: Not on file   Stress: Not on file   Social Connections: Not on file   Intimate Partner Violence: Not on file   Housing Stability: Not on file       Family History   Problem Relation Age of Onset    No Known Problems Mother     No Known Problems Father     Multiple Sclerosis Maternal Grandmother     Cancer Paternal Grandmother         Breast    Diabetes Paternal Grandfather        Past Surgical History:   Procedure Laterality Date    BREAST RECONSTRUCTION Bilateral 02/14/2019    Breast augmentation           Objective:     /70 (BP Location: Left arm, Patient Position: Sitting, BP Cuff Size: Adult)   Pulse 77   Temp 37.1 °C (98.8 °F) (Temporal)   Resp 16   Ht 1.626 m (5' 4\")   Wt 52.9 kg (116 lb 11.2 oz)   LMP 04/27/2024   SpO2 98%   BMI 20.03 kg/m²     Physical Exam  Vitals reviewed.   Constitutional:       General: She is not in acute distress.     Appearance: Normal appearance. She is not ill-appearing or toxic-appearing.   HENT:      Head: Normocephalic and atraumatic.      Right Ear: Tympanic membrane, ear canal and external ear normal.      Left Ear: Tympanic membrane, ear canal and external ear normal.      Nose: Congestion present. No rhinorrhea.      Mouth/Throat:     "  Mouth: Mucous membranes are moist.      Pharynx: Oropharynx is clear. No oropharyngeal exudate or posterior oropharyngeal erythema.      Tonsils: No tonsillar exudate.   Eyes:      General: No scleral icterus.        Right eye: No discharge.         Left eye: No discharge.      Extraocular Movements: Extraocular movements intact.      Conjunctiva/sclera: Conjunctivae normal.   Neck:      Thyroid: No thyroid mass or thyromegaly.   Cardiovascular:      Rate and Rhythm: Normal rate and regular rhythm.      Pulses: Normal pulses.      Heart sounds: Normal heart sounds. No murmur heard.  Pulmonary:      Effort: Pulmonary effort is normal. No respiratory distress.      Breath sounds: Examination of the right-lower field reveals wheezing. Examination of the left-lower field reveals wheezing. Wheezing present.   Abdominal:      General: Bowel sounds are normal. There is no distension.      Palpations: Abdomen is soft. There is no mass.      Tenderness: There is no abdominal tenderness.   Musculoskeletal:         General: Normal range of motion.      Cervical back: Normal range of motion and neck supple. No tenderness.   Lymphadenopathy:      Cervical: No cervical adenopathy.   Skin:     General: Skin is warm and dry.      Coloration: Skin is not jaundiced.   Neurological:      General: No focal deficit present.      Mental Status: She is alert and oriented to person, place, and time.      Gait: Gait normal.   Psychiatric:         Mood and Affect: Mood normal.         Behavior: Behavior normal.         Thought Content: Thought content normal.         Judgment: Judgment normal.            Assessment and Plan:       Assessment & Plan  1. Acute cough  2. Wheezing  3. Nasal congestion  Acute. An allergy test will be conducted to identify any potential allergens. The patient is advised to continue her daily allergy medication. Over-the-counter fluticasone or Flonase nasal spray may be used to alleviate congestion and  inflammation. Cough lozenges may be used to alleviate the cough. Tea with honey and lemon may also provide relief for the sore throat. A prescription for an albuterol inhaler will be provided. Should the patient's symptoms persist or worsen, she is advised to return for further evaluation and testing.  If wheezing persists, recommend doing workup for asthma.  - albuterol 108 (90 Base) MCG/ACT Aero Soln inhalation aerosol; Inhale 2 Puffs every four hours as needed for Shortness of Breath.  Dispense: 1 Each; Refill: 0  - ALLERGY ZONE 15      4. Encounter for administration of vaccine  Patient received first Gardasil today.  Tolerated well.  Recommend second dose in 2 months and third dose in 6 months.  - Gardasil 9    5. Family history of diabetes mellitus  Screening labs as listed below. Will follow up after completing labs.   - HEMOGLOBIN A1C; Future  - TSH WITH REFLEX TO FT4; Future  - Comp Metabolic Panel; Future  - CBC WITH DIFFERENTIAL; Future  - Lipid Profile; Future    6. Screening for lipid disorders  - Lipid Profile; Future    7. Screening for thyroid disorder  - TSH WITH REFLEX TO FT4; Future    8. Screening for disorder of blood and blood-forming organs  - CBC WITH DIFFERENTIAL; Future    9. Encounter for vitamin deficiency screening  - VITAMIN D 25-HYDROXY    10. Screening for HIV (human immunodeficiency virus)  - HIV AG/AB COMBO ASSAY SCREENING; Future    11. Need for hepatitis C screening test  - HEP C VIRUS ANTIBODY; Future    12. ADHD (attention deficit hyperactivity disorder), inattentive type  13. Generalized anxiety disorder  Chronic and stable.  Managed by psychiatry.  Will continue with Methylphenidate 40 mg and hydroxyzine 50 mg nightly as needed, and Gabapentin 600 mg nightly until pt weans down completely. Will f/u with psych as planned.       Follow-up  The patient is scheduled for a follow-up visit in 6 months for her annual physical examination.        Pt agreed with treatment plan and  verbalized understanding.         Return in about 6 months (around 10/29/2024) for Annual Wellness Visit, Lab Results.     Please note that this dictation was created using voice recognition software. I have made every reasonable attempt to correct obvious errors, but I expect that there are errors of grammar and possibly content that I did not discover before finalizing the note.    Melissa Posey PA-C  [facility]

## 2024-05-03 ENCOUNTER — HOSPITAL ENCOUNTER (OUTPATIENT)
Facility: MEDICAL CENTER | Age: 39
End: 2024-05-03
Attending: PHYSICIAN ASSISTANT
Payer: COMMERCIAL

## 2024-05-03 ENCOUNTER — GYNECOLOGY VISIT (OUTPATIENT)
Dept: OBGYN | Facility: CLINIC | Age: 39
End: 2024-05-03
Payer: COMMERCIAL

## 2024-05-03 VITALS
BODY MASS INDEX: 19.63 KG/M2 | SYSTOLIC BLOOD PRESSURE: 125 MMHG | HEIGHT: 64 IN | WEIGHT: 115 LBS | DIASTOLIC BLOOD PRESSURE: 88 MMHG

## 2024-05-03 DIAGNOSIS — Z12.4 SCREENING FOR CERVICAL CANCER: ICD-10-CM

## 2024-05-03 DIAGNOSIS — Z01.419 WELL WOMAN EXAM: ICD-10-CM

## 2024-05-03 PROCEDURE — 99385 PREV VISIT NEW AGE 18-39: CPT | Performed by: PHYSICIAN ASSISTANT

## 2024-05-03 PROCEDURE — 3079F DIAST BP 80-89 MM HG: CPT | Performed by: PHYSICIAN ASSISTANT

## 2024-05-03 PROCEDURE — 3074F SYST BP LT 130 MM HG: CPT | Performed by: PHYSICIAN ASSISTANT

## 2024-05-03 NOTE — PROGRESS NOTES
ANNUAL GYNECOLOGY VISIT    Chief Complaint  Annual    Subjective  Tonya Yasir Hillman is a 38 y.o. female  patient's last menstrual period was 2024 using Samantha placed 10/2022 for contraception who presents today for Annual Exam.      Pt reports weight on our scale today is down from PCP last month. Does not note any changes in the way clothes fit. Does not have scale at home but does get weighed regularly at psych visits with no fluctuation in weight. No recent changes in diet or exercise routine.     Preventive Care   Immunization History   Administered Date(s) Administered    9VHPV VACCINE 2-3 DOSE IM (GARDASIL 9) 2024    PFIZER PURPLE CAP SARS-COV-2 VACCINATION (12+) 2021, 2021       Guardasil HPV vaccine: 1 dose, in process of getting series    Gynecology History and ROS  Current Sexual Activity: yes - monogamous male partner   History of sexually transmitted diseases? no  Abnormal discharge? no  Current Contraception:  Samantha IUD    Menstrual History  Patient's last menstrual period was 2024.  Periods are regular  q 28 days, lasting 5 days. Pt does report abnormal cycle this month that started a few days early and now with continued spotting after. Previously cycle were very regular.   Clots or heavy flow: no  Dysmenorrhea: no  Intermenstrual bleeding/spotting: no  Significant pain with periods:no  Bothersome PMS symptoms: no  Significant Pelvic Pain: no    Pap History  Last pap smear: 3 years ago  History of moderate or severe dysplasia: no    Cancer Risk Assessement:  Family history of:   - Breast cancer: PGM    - Ovarian cancer: no   - Uterine cancer: no   - Colon cancer: no    Obstetric History  OB History    Para Term  AB Living   1 1 1 0 0 1   SAB IAB Ectopic Molar Multiple Live Births   0 0 0 0 0 1      # Outcome Date GA Lbr Donald/2nd Weight Sex Delivery Anes PTL Lv   1 Term  40w0d  7 lb M Vag-Spont  N AVA       Past Medical History  History  reviewed. No pertinent past medical history.    Past Surgical History  Past Surgical History:   Procedure Laterality Date    BREAST RECONSTRUCTION Bilateral 02/14/2019    Breast augmentation       Social History  Social History     Tobacco Use    Smoking status: Never    Smokeless tobacco: Never   Vaping Use    Vaping Use: Never used   Substance Use Topics    Alcohol use: Not Currently    Drug use: Never        Family History  Family History   Problem Relation Age of Onset    No Known Problems Mother     No Known Problems Father     Multiple Sclerosis Maternal Grandmother     Cancer Paternal Grandmother 92        Breast    Diabetes Paternal Grandfather        Home Medications  Current Outpatient Medications   Medication Sig    albuterol 108 (90 Base) MCG/ACT Aero Soln inhalation aerosol Inhale 2 Puffs every four hours as needed for Shortness of Breath.    Methylphenidate HCl ER, PM, (JORNAY PM) 40 MG CAPSULE SR 24 HR Take 1 Capsule by mouth at bedtime for 30 days.    hydrOXYzine HCl (ATARAX) 50 MG Tab Take 1 Tablet by mouth at bedtime for 180 days.    gabapentin (NEURONTIN) 600 MG tablet Take 1.5 Tablets by mouth at bedtime for 180 days.    tretinoin (RETIN-A) 0.1 % cream APPLY A THIN LAYER TO THE AFFECTED AREA(S) NIGHTLY AT BEDTIME. START WITH EVERY OTHER NIGHT AND INCREASE TO NIGHTLY AS TOLERATED    Levonorgestrel (LEWIS) 13.5 MG IUD by Intrauterine route.       Allergies/Reactions  No Known Allergies    ROS  Positive ROS: none   Gen: no fevers or chills, no significant weight loss or gain, excessive fatigue  Respiratory:  no cough or dyspnea  Cardiac:  no chest pain, no palpitations, no syncope  Breast: no breast discharge, pain, lump or skin changes  GI:  no heartburn, no abdominal pain, no nausea or vomiting  Urinary: no dysuria, urgency, frequency, incontinence   Psych: no depression or anxiety  Neuro: no migraines with aura, fainting spells, numbness or tingling  Extremities: no joint pain, persistently  "swollen ankles, recurrent leg cramps      Physical Examination:  Vital Signs: /88 (BP Location: Left arm, Patient Position: Sitting, BP Cuff Size: Adult)   Ht 5' 4\"   Wt 115 lb   LMP 04/27/2024 Comment: IRREGULAR WITH IUD  BMI 19.74 kg/m²       Constitutional: The patient is well developed and well nourished.  Psychiatric: Patient is oriented to time place and person.   Skin: No rash observed.  Neck: Appears symmetric. Thyroid normal size  Respiratory: normal effort  Breast: Inspection reveals no asymetry or nipple discharge, no skin thickening, dimpling or erythema.  Palpation demonstrates no masses.  Abdomen: Soft, non-tender.  Pelvic Exam:      Vulva: external female genitalia are normal in appearance. No lesions     Urethra - no lesions, no erythema     Vagina: moist, pink, normal ruggae     Cervix: pink, smooth, no lesions, no CMT, strings visualized     Uterus: non-tender, normal size, shape, contour, mobile, anteverted     Ovaries: non-tender, no appreciable masses    Pap Smear performed: Yes    Chaperone Present: Yaneth   Extremeties: Legs are symmetric and without tenderness. There is no edema present.        Assessment & Plan  Tonya Hillman is a 38 y.o. female who presents today for Annual Gyn Exam.     1. Well woman exam    - Anticipatory guidance given. Encouraged adequate water intake, healthy diet, regular exercise. Educated on Pap smear screening and guidelines for age per ACOG and ASSCP. Discussed safe sex, STI prevention, contraception/family planning. Self breast exam taught.   - Advised pt weight may fluctuate from scale to scale. Chart review shows only 3lb difference from 4/17. She is to track weight with psych as this is a consistent scale and f/u with PCP if losing weight unintentionally.   - Samantha will need to be replaced 10/2024. Discussed IUD does not regulate menses. Continue to monitor for menstrual changes. We can replace Samantha with a Mirena sooner if she continues " to have longer cycles.     2. Screening for cervical cancer    - THINPREP PAP W/HPV AND CTNG; Future            Return: Annually or PRN    Sana Armenta P.A.-C.  [facility]

## 2024-05-08 LAB
C TRACH RRNA CVX QL NAA+PROBE: NEGATIVE
CYTOLOGIST CVX/VAG CYTO: NORMAL
CYTOLOGY CVX/VAG DOC CYTO: NORMAL
CYTOLOGY CVX/VAG DOC THIN PREP: NORMAL
HPV I/H RISK 4 DNA CVX QL PROBE+SIG AMP: NEGATIVE
N GONORRHOEA RRNA CVX QL NAA+PROBE: NEGATIVE
NOTE NL11727A: NORMAL
OTHER STN SPEC: NORMAL
STAT OF ADQ CVX/VAG CYTO-IMP: NORMAL

## 2024-05-15 ENCOUNTER — OFFICE VISIT (OUTPATIENT)
Dept: BEHAVIORAL HEALTH | Facility: PSYCHIATRIC FACILITY | Age: 39
End: 2024-05-15
Payer: COMMERCIAL

## 2024-05-15 VITALS
HEIGHT: 64 IN | HEART RATE: 68 BPM | OXYGEN SATURATION: 92 % | DIASTOLIC BLOOD PRESSURE: 72 MMHG | WEIGHT: 118 LBS | SYSTOLIC BLOOD PRESSURE: 110 MMHG | BODY MASS INDEX: 20.14 KG/M2

## 2024-05-15 DIAGNOSIS — F90.0 ADHD (ATTENTION DEFICIT HYPERACTIVITY DISORDER), INATTENTIVE TYPE: ICD-10-CM

## 2024-05-15 DIAGNOSIS — F19.94 SUBSTANCE INDUCED MOOD DISORDER (HCC): ICD-10-CM

## 2024-05-15 DIAGNOSIS — F41.1 GENERALIZED ANXIETY DISORDER: ICD-10-CM

## 2024-05-15 PROCEDURE — 99213 OFFICE O/P EST LOW 20 MIN: CPT | Performed by: STUDENT IN AN ORGANIZED HEALTH CARE EDUCATION/TRAINING PROGRAM

## 2024-05-15 PROCEDURE — 3078F DIAST BP <80 MM HG: CPT | Performed by: STUDENT IN AN ORGANIZED HEALTH CARE EDUCATION/TRAINING PROGRAM

## 2024-05-15 PROCEDURE — 3074F SYST BP LT 130 MM HG: CPT | Performed by: STUDENT IN AN ORGANIZED HEALTH CARE EDUCATION/TRAINING PROGRAM

## 2024-05-15 RX ORDER — HYDROXYZINE 50 MG/1
50 TABLET, FILM COATED ORAL
Qty: 90 TABLET | Refills: 1 | Status: SHIPPED | OUTPATIENT
Start: 2024-05-15 | End: 2024-11-11

## 2024-05-15 RX ORDER — METHYLPHENIDATE HYDROCHLORIDE 40 MG/1
40 CAPSULE ORAL
Qty: 30 CAPSULE | Refills: 0 | Status: SHIPPED | OUTPATIENT
Start: 2024-06-14 | End: 2024-05-30

## 2024-05-15 RX ORDER — GABAPENTIN 100 MG/1
100 CAPSULE ORAL
Qty: 30 CAPSULE | Refills: 3 | Status: SHIPPED | OUTPATIENT
Start: 2024-05-15 | End: 2024-05-30

## 2024-05-15 RX ORDER — METHYLPHENIDATE HYDROCHLORIDE 40 MG/1
1 CAPSULE ORAL
Qty: 30 CAPSULE | Refills: 0 | Status: SHIPPED | OUTPATIENT
Start: 2024-05-15 | End: 2024-05-30

## 2024-05-15 RX ORDER — METHYLPHENIDATE HYDROCHLORIDE 40 MG/1
40 CAPSULE ORAL NIGHTLY
Qty: 30 CAPSULE | Refills: 0 | Status: SHIPPED | OUTPATIENT
Start: 2024-07-12 | End: 2024-08-11

## 2024-05-15 ASSESSMENT — ENCOUNTER SYMPTOMS
HEADACHES: 0
VOMITING: 0
CHILLS: 0
DIZZINESS: 0
PALPITATIONS: 0
NAUSEA: 0
COUGH: 0
HALLUCINATIONS: 0
NERVOUS/ANXIOUS: 0
FEVER: 0
DEPRESSION: 0
INSOMNIA: 0

## 2024-05-15 ASSESSMENT — PATIENT HEALTH QUESTIONNAIRE - PHQ9
5. POOR APPETITE OR OVEREATING: 0 - NOT AT ALL
CLINICAL INTERPRETATION OF PHQ2 SCORE: 0

## 2024-05-15 ASSESSMENT — ANXIETY QUESTIONNAIRES
3. WORRYING TOO MUCH ABOUT DIFFERENT THINGS: SEVERAL DAYS
5. BEING SO RESTLESS THAT IT IS HARD TO SIT STILL: NOT AT ALL
7. FEELING AFRAID AS IF SOMETHING AWFUL MIGHT HAPPEN: NOT AT ALL
IF YOU CHECKED OFF ANY PROBLEMS ON THIS QUESTIONNAIRE, HOW DIFFICULT HAVE THESE PROBLEMS MADE IT FOR YOU TO DO YOUR WORK, TAKE CARE OF THINGS AT HOME, OR GET ALONG WITH OTHER PEOPLE: SOMEWHAT DIFFICULT
2. NOT BEING ABLE TO STOP OR CONTROL WORRYING: SEVERAL DAYS
1. FEELING NERVOUS, ANXIOUS, OR ON EDGE: MORE THAN HALF THE DAYS
6. BECOMING EASILY ANNOYED OR IRRITABLE: SEVERAL DAYS
4. TROUBLE RELAXING: SEVERAL DAYS
GAD7 TOTAL SCORE: 6

## 2024-05-15 ASSESSMENT — LIFESTYLE VARIABLES: SUBSTANCE_ABUSE: 0

## 2024-05-15 NOTE — PROGRESS NOTES
Evaluation completed by: Danay Choudhary D.O.   Date of Service: 05/15/2024  Appointment type: in-office appointment  Attending:  Dr. Latisha Rodríguez M.D.  Information below was collected from: Patient    CHIEF COMPLIANT:  Medication Management      HPI:   Tonya Hillman is a 38 y.o. old female who presents today for regularly scheduled follow up for assessment of Medication Management       Pt's last visit was on 04/17/2024 at which time plan was to:  -Continue gabapentin 600mg QHS  -Continue hydroxyzine 50mg QHS   -Continue Jornay 40mg QHS for ADHD     Pt reports continued stability, denies s/e, and loves the subtle impact Jornay has on her, more specifically, she is able to focus and continuing to do well at her job but not experiencing any euphoria or any other substance cravings or other negative physical impact. denies substance use as well.     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 Drinks about 3 cups coffee in early morning hours. One celcius in the evening. If she wants to take a rare nap, she still can do that even taking Jornay. Is attempting to decrease overall caffeine intake.    Pt has done well decreasing gabapentin 600mg to 300mg, for the past 1-2 weeks. Does not notice a noticeable difference and would like to go ahead and continue taper off. Will be prescribing lower dose today and give patient options as to when she would like to fully taper off.    Pt states she continues to utilize hydroxyzine with good effect for anxiety and insomnia nightly and once this past month used it PRN during the day (when toddler was having 2+ hour tantrum after receiving steroids). States she would like to continue on current doseage.     Gave psychoeducation that patient can continue to decrease scheduled medications and only have others on hand if it would help her feel more comfortable on a PRN basis more so as  rescue which she is fearful of having another psychotic/manic episode that she experienced previously. They had a strong substance use component in the past and re-explained such to patient to help calm anxieties surrounding such.      PSYCHIATRIC REVIEW OF SYSTEMS: current symptoms as reported by patient  Depression: DENIES overt symptoms of depression such as marked depressed mood, anhedonia, sleep disturbances (only recent/intermittent difficulty due to sick toddler with croup, unrelated to gabapentin decrease), low energy/concentration, appetite changes  Jeanine: Denies overt symptoms of jeanine such as marked elevated mood, distractability, irritability, grandiosity, flight of ideas   Anxiety/Panic Attacks: Denies overt symptoms of anxiety such as increased restlessness, panic attacks   PTSD symptom:  Patient reports no signs or symptoms indicative of PTSD   Psychosis: Patient reports no signs or symptoms indicative of psychosis    REVIEW OF SYSTEMS   Review of Systems   Constitutional:  Negative for chills and fever.   Respiratory:  Negative for cough.    Cardiovascular:  Negative for chest pain and palpitations.   Gastrointestinal:  Negative for nausea and vomiting.   Neurological:  Negative for dizziness and headaches.   Psychiatric/Behavioral:  Negative for depression, hallucinations, substance abuse and suicidal ideas. The patient is not nervous/anxious and does not have insomnia.      PSYCHIATRIC EXAMINATION   Musculoskeletal: No abnormal movements noted and wnl  Appearance:  well-developed, well-nourished, appears stated age, fair hygiene, no apparent distress, and appropriately dressed, cooperative, engaged, friendly, and pleasant  Thought Process:   linear, coherent, goal-oriented, and organized  Abnormal or Psychotic Thoughts: Denies SI, denies HI, and no overt delusions noted and No overt delusions noted  SI/HI: Denies SI and HI  Speech: regular rate, rhythm, volume, tone, and syntax, coherent, and  "spontaneous  Mood: \"Good, good!\"  Affect: euthymic, and congruent with mood  Orientation: alert and oriented  Recent and Remote Memory: no gross impairment in immediate, recent, or remote memory  Attention Span and Concentration: Intact  Insight/Judgement into symptoms: good  Neurological Testing (MSSE Score and/or clock drawing): MMSE not performed during this encounter  /72 (BP Location: Left arm, Patient Position: Sitting, BP Cuff Size: Adult)   Pulse 68   Ht 1.626 m (5' 4\")   Wt 53.5 kg (118 lb)   LMP 04/27/2024 Comment: IRREGULAR WITH IUD  SpO2 92%   BMI 20.25 kg/m²     CURRENT MEDICATIONS    Current Outpatient Medications:     albuterol 108 (90 Base) MCG/ACT Aero Soln inhalation aerosol, Inhale 2 Puffs every four hours as needed for Shortness of Breath., Disp: 1 Each, Rfl: 0    Methylphenidate HCl ER, PM, (JORNAY PM) 40 MG CAPSULE SR 24 HR, Take 1 Capsule by mouth at bedtime for 30 days., Disp: 30 Capsule, Rfl: 0    hydrOXYzine HCl (ATARAX) 50 MG Tab, Take 1 Tablet by mouth at bedtime for 180 days., Disp: 90 Tablet, Rfl: 1    tretinoin (RETIN-A) 0.1 % cream, APPLY A THIN LAYER TO THE AFFECTED AREA(S) NIGHTLY AT BEDTIME. START WITH EVERY OTHER NIGHT AND INCREASE TO NIGHTLY AS TOLERATED, Disp: , Rfl:     Levonorgestrel (LEWIS) 13.5 MG IUD, by Intrauterine route., Disp: , Rfl:     PAST MEDICAL HISTORY  No past medical history on file.  No Known Allergies  Past Surgical History:   Procedure Laterality Date    BREAST RECONSTRUCTION Bilateral 02/14/2019    Breast augmentation        SCREENINGS:      11/22/2023     9:00 AM 4/29/2024    11:20 AM 5/15/2024     9:00 AM   Depression Screen (PHQ-2/PHQ-9)   PHQ-2 Total Score 0 0 0         5/15/2024     9:14 AM 11/22/2023    10:11 AM    EVELYN-7 ANXIETY SCALE FLOWSHEET   Feeling nervous, anxious, or on edge 2 2   Not being able to stop or control worrying 1 1   Worrying too much about different things 1 1   Trouble relaxing 1 2   Being so restless that it is hard " "to sit still 0 1   Becoming easily annoyed or irritable 1 2   Feeling afraid as if something awful might happen 0 1   EVELYN-7 Total Score 6 10   How difficult have these problems made it for you to do your work, take care of things at home, or get along with other people? Somewhat difficult Somewhat difficult       LABS:  No results found for: \"CHOLSTRLTOT\", \"TRIGLYCERIDE\", \"HDL\", \"LDL\"  No results found for: \"SODIUM\", \"POTASSIUM\", \"CHLORIDE\", \"CO2\", \"ANION\", \"GLUCOSE\", \"BUN\", \"CREATININE\", \"CALCIUM\", \"ASTSGOT\", \"ALTSGPT\", \"TBILIRUBIN\", \"ALBUMIN\", \"TOTPROTEIN\", \"GLOBULIN\", \"AGRATIO\"  Lab Results   Component Value Date/Time    WBC 8.0 10/24/2022 10:16 AM    HEMOGLOBIN 13.7 10/24/2022 10:16 AM    HEMATOCRIT 39.4 10/24/2022 10:16 AM    MCV 93 10/24/2022 10:16 AM    MCH 32.2 10/24/2022 10:16 AM    MCHC 34.8 10/24/2022 10:16 AM    RDW 12.8 10/24/2022 10:16 AM    PLATELETCT 280 10/24/2022 10:16 AM    NEUTS 4.8 10/24/2022 10:16 AM    MONOS 0.6 10/24/2022 10:16 AM    EOS 0.1 10/24/2022 10:16 AM    BASO 0.0 10/24/2022 10:16 AM    NRBCAB 0.0 10/24/2022 10:16 AM     Lab Results   Component Value Date/Time    HBA1C 5.1 10/24/2022 1016    AVGLUC 100 10/24/2022 1016     No results found for: \"TSHULTRASEN\"  No results found for: \"FREET4\"  No results found for: \"25HYDROXY\"    PREVENTATIVE CARE  Medication Monitoring: Stimulants: Reviewed height, weight, blood pressure, and pulse.  No concerns. Signed Controlled Substance Agreement.       ASSESSMENT  Tonya Hillman is a 38 y.o. old female who presents today for regularly scheduled follow up for assessment of Medication Management    See below for detailed plan    [place]  records   reviewed.  No concerns about misuse of controlled substance.    CURRENT RISK ASSESSMENT       Suicide: Low       Homicide: Low       Self-Harm: Low       Relapse: Low       Crisis Safety Plan Reviewed: Not indicated    DIAGNOSES/PLAN  Problem List Items Addressed This Visit          Psychiatry " Problems    Substance induced mood disorder (HCC)        Resolved, stable     Hx of postpartum psychotic sx (Pt has IUD, no current plans of getting pregnant in the future)                         Generalized anxiety disorder       Plan to slowly taper gabapentin in light of improving anxiety and in addition to observe clearer picture of what impact stimulants may be having to treat ADHD that may lead to continued decreased anxiety/insomnia.  Pt has since self tapered to gabapentin 300mg without issue.     -DECREASE gabapentin 600mg to 100mg QHS (pt self tapered down to 300mg. Taking 300mg currently)  -Continue hydroxyzine 50mg QHS                ADHD (attention deficit hyperactivity disorder), inattentive type       Patient's significant, debilitating ADHD symptoms of inattentive nature that she has been previously diagnosed and treated for prior has been improving on trialing of Jornay and greatly improved since titration. We will keep current dose and continue to monitor pt's level of comfort level, cravings of substances, and caution against substance misuse - long delayed release of Jornay has certainly assisted with such.      Continuing to discuss and education patient regarding complex history of substance induced mood disorder, substance abuse history, medication options, side effects, and options to reach out for help including emergency and clinic numbers/my chart messages.        -CONTINUE Jornay 40mg QHS for ADHD                           Medication options, alternatives (including no medications) and medication risks/benefits/side effects were discussed in detail.  The patient was advised to call, message clinician on [employer], or come in to the clinic if symptoms worsen or if questions/issues regarding their medications arise.  The patient verbalized understanding and agreement.    The patient was educated to call 911, call the suicide hotline, or go to the local ER if having thoughts of suicide or  homicide.  The patient verbalized understanding and agreement.   The proposed treatment plan was discussed with the patient who was provided the opportunity to ask questions and make suggestions regarding alternative treatment. Patient verbalized understanding and expressed agreement with the plan.      Follow up in 1 month or sooner as needed.    This appointment was supervised by attending psychiatrist, Dr. Latisha Rodríguez M.D., who agrees with assessment and treatment plan.  See attending attestation for more details.

## 2024-05-15 NOTE — ASSESSMENT & PLAN NOTE
Plan to slowly taper gabapentin in light of improving anxiety and in addition to observe clearer picture of what impact stimulants may be having to treat ADHD that may lead to continued decreased anxiety/insomnia.  Pt has since self tapered to gabapentin 300mg without issue.     -DECREASE gabapentin 600mg to 100mg QHS (pt self tapered down to 300mg. Taking 300mg currently)  -Continue hydroxyzine 50mg QHS

## 2024-05-21 ENCOUNTER — APPOINTMENT (OUTPATIENT)
Dept: MEDICAL GROUP | Facility: IMAGING CENTER | Age: 39
End: 2024-05-21
Payer: COMMERCIAL

## 2024-05-29 ENCOUNTER — HOSPITAL ENCOUNTER (OUTPATIENT)
Dept: LAB | Facility: MEDICAL CENTER | Age: 39
End: 2024-05-29
Attending: STUDENT IN AN ORGANIZED HEALTH CARE EDUCATION/TRAINING PROGRAM
Payer: COMMERCIAL

## 2024-05-29 DIAGNOSIS — Z11.59 NEED FOR HEPATITIS C SCREENING TEST: ICD-10-CM

## 2024-05-29 DIAGNOSIS — Z13.29 SCREENING FOR THYROID DISORDER: ICD-10-CM

## 2024-05-29 DIAGNOSIS — Z13.0 SCREENING FOR DISORDER OF BLOOD AND BLOOD-FORMING ORGANS: ICD-10-CM

## 2024-05-29 DIAGNOSIS — Z11.4 SCREENING FOR HIV (HUMAN IMMUNODEFICIENCY VIRUS): ICD-10-CM

## 2024-05-29 DIAGNOSIS — Z13.220 SCREENING FOR LIPID DISORDERS: ICD-10-CM

## 2024-05-29 DIAGNOSIS — Z83.3 FAMILY HISTORY OF DIABETES MELLITUS: ICD-10-CM

## 2024-05-29 LAB
25(OH)D3 SERPL-MCNC: 24 NG/ML (ref 30–100)
ALBUMIN SERPL BCP-MCNC: 4.5 G/DL (ref 3.2–4.9)
ALBUMIN/GLOB SERPL: 1.6 G/DL
ALP SERPL-CCNC: 68 U/L (ref 30–99)
ALT SERPL-CCNC: 12 U/L (ref 2–50)
ANION GAP SERPL CALC-SCNC: 11 MMOL/L (ref 7–16)
AST SERPL-CCNC: 20 U/L (ref 12–45)
BASOPHILS # BLD AUTO: 0.7 % (ref 0–1.8)
BASOPHILS # BLD: 0.04 K/UL (ref 0–0.12)
BILIRUB SERPL-MCNC: 0.6 MG/DL (ref 0.1–1.5)
BUN SERPL-MCNC: 13 MG/DL (ref 8–22)
CALCIUM ALBUM COR SERPL-MCNC: 8.8 MG/DL (ref 8.5–10.5)
CALCIUM SERPL-MCNC: 9.2 MG/DL (ref 8.4–10.2)
CHLORIDE SERPL-SCNC: 104 MMOL/L (ref 96–112)
CHOLEST SERPL-MCNC: 140 MG/DL (ref 100–199)
CO2 SERPL-SCNC: 23 MMOL/L (ref 20–33)
CREAT SERPL-MCNC: 0.82 MG/DL (ref 0.5–1.4)
EOSINOPHIL # BLD AUTO: 0.11 K/UL (ref 0–0.51)
EOSINOPHIL NFR BLD: 1.9 % (ref 0–6.9)
ERYTHROCYTE [DISTWIDTH] IN BLOOD BY AUTOMATED COUNT: 42.6 FL (ref 35.9–50)
EST. AVERAGE GLUCOSE BLD GHB EST-MCNC: 97 MG/DL
GFR SERPLBLD CREATININE-BSD FMLA CKD-EPI: 95 ML/MIN/1.73 M 2
GLOBULIN SER CALC-MCNC: 2.8 G/DL (ref 1.9–3.5)
GLUCOSE SERPL-MCNC: 82 MG/DL (ref 65–99)
HBA1C MFR BLD: 5 % (ref 4–5.6)
HCT VFR BLD AUTO: 41 % (ref 37–47)
HCV AB SER QL: NORMAL
HDLC SERPL-MCNC: 58 MG/DL
HGB BLD-MCNC: 14 G/DL (ref 12–16)
HIV 1+2 AB+HIV1 P24 AG SERPL QL IA: NORMAL
IMM GRANULOCYTES # BLD AUTO: 0.01 K/UL (ref 0–0.11)
IMM GRANULOCYTES NFR BLD AUTO: 0.2 % (ref 0–0.9)
LDLC SERPL CALC-MCNC: 73 MG/DL
LYMPHOCYTES # BLD AUTO: 2.3 K/UL (ref 1–4.8)
LYMPHOCYTES NFR BLD: 38.7 % (ref 22–41)
MCH RBC QN AUTO: 31.9 PG (ref 27–33)
MCHC RBC AUTO-ENTMCNC: 34.1 G/DL (ref 32.2–35.5)
MCV RBC AUTO: 93.4 FL (ref 81.4–97.8)
MONOCYTES # BLD AUTO: 0.38 K/UL (ref 0–0.85)
MONOCYTES NFR BLD AUTO: 6.4 % (ref 0–13.4)
NEUTROPHILS # BLD AUTO: 3.1 K/UL (ref 1.82–7.42)
NEUTROPHILS NFR BLD: 52.1 % (ref 44–72)
NRBC # BLD AUTO: 0 K/UL
NRBC BLD-RTO: 0 /100 WBC (ref 0–0.2)
PLATELET # BLD AUTO: 316 K/UL (ref 164–446)
PMV BLD AUTO: 9.4 FL (ref 9–12.9)
POTASSIUM SERPL-SCNC: 3.5 MMOL/L (ref 3.6–5.5)
PROT SERPL-MCNC: 7.3 G/DL (ref 6–8.2)
RBC # BLD AUTO: 4.39 M/UL (ref 4.2–5.4)
SODIUM SERPL-SCNC: 138 MMOL/L (ref 135–145)
TRIGL SERPL-MCNC: 43 MG/DL (ref 0–149)
TSH SERPL DL<=0.005 MIU/L-ACNC: 2.41 UIU/ML (ref 0.38–5.33)
WBC # BLD AUTO: 5.9 K/UL (ref 4.8–10.8)

## 2024-05-30 ENCOUNTER — OFFICE VISIT (OUTPATIENT)
Dept: MEDICAL GROUP | Facility: IMAGING CENTER | Age: 39
End: 2024-05-30
Payer: COMMERCIAL

## 2024-05-30 ENCOUNTER — HOSPITAL ENCOUNTER (OUTPATIENT)
Facility: MEDICAL CENTER | Age: 39
End: 2024-05-30
Attending: STUDENT IN AN ORGANIZED HEALTH CARE EDUCATION/TRAINING PROGRAM
Payer: COMMERCIAL

## 2024-05-30 ENCOUNTER — APPOINTMENT (OUTPATIENT)
Dept: MEDICAL GROUP | Facility: IMAGING CENTER | Age: 39
End: 2024-05-30
Payer: COMMERCIAL

## 2024-05-30 VITALS
HEIGHT: 64 IN | SYSTOLIC BLOOD PRESSURE: 118 MMHG | HEART RATE: 66 BPM | OXYGEN SATURATION: 96 % | WEIGHT: 117.8 LBS | TEMPERATURE: 98.3 F | RESPIRATION RATE: 16 BRPM | BODY MASS INDEX: 20.11 KG/M2 | DIASTOLIC BLOOD PRESSURE: 70 MMHG

## 2024-05-30 DIAGNOSIS — R19.5 ABNORMAL STOOLS: ICD-10-CM

## 2024-05-30 DIAGNOSIS — E87.6 HYPOKALEMIA: ICD-10-CM

## 2024-05-30 DIAGNOSIS — Z71.2 ENCOUNTER TO DISCUSS TEST RESULTS: ICD-10-CM

## 2024-05-30 DIAGNOSIS — Z23 ENCOUNTER FOR ADMINISTRATION OF VACCINE: ICD-10-CM

## 2024-05-30 DIAGNOSIS — E55.9 VITAMIN D DEFICIENCY: ICD-10-CM

## 2024-05-30 LAB
A ALTERNATA IGE QN: <0.1 KU/L
A FUMIGATUS IGE QN: <0.1 KU/L
BERMUDA GRASS IGE QN: <0.1 KU/L
BOXELDER IGE QN: <0.1 KU/L
C PARVUM AG STL QL IA.RAPID: NEGATIVE
C SPHAEROSPERMUM IGE QN: <0.1 KU/L
CAT DANDER IGE QN: <0.1 KU/L
CMN PIGWEED IGE QN: <0.1 KU/L
COMMON RAGWEED IGE QN: <0.1 KU/L
COTTONWOOD IGE QN: <0.1 KU/L
D FARINAE IGE QN: <0.1 KU/L
D PTERONYSS IGE QN: <0.1 KU/L
DEPRECATED MISC ALLERGEN IGE RAST QL: NORMAL
DOG DANDER IGE QN: <0.1 KU/L
G LAMBLIA AG STL QL IA.RAPID: NEGATIVE
IGE SERPL-ACNC: 4 KU/L
M RACEMOSUS IGE QN: <0.1 KU/L
MOUSE EPITH IGE QN: <0.1 KU/L
MT JUNIPER IGE QN: <0.1 KU/L
MUGWORT IGE QN: <0.1 KU/L
OLIVE POLN IGE QN: <0.1 KU/L
P NOTATUM IGE QN: <0.1 KU/L
ROACH IGE QN: <0.1 KU/L
SALTWORT IGE QN: <0.1 KU/L
TIMOTHY IGE QN: <0.1 KU/L
WHITE ELM IGE QN: <0.1 KU/L
WHITE MULBERRY IGE QN: <0.1 KU/L
WHITE OAK IGE QN: <0.1 KU/L

## 2024-05-30 ASSESSMENT — FIBROSIS 4 INDEX: FIB4 SCORE: 0.66

## 2024-05-30 NOTE — PROGRESS NOTES
"Subjective:     CC:   Chief Complaint   Patient presents with    Other     Odd Stool, still sees discolored parts of stool however smaller than the worrying one    Lab Results       HPI:     Verbal consent was acquired by the patient to use [employer] ambient listening note generation during this visit Yes      joey Lang, 38 y.o., female,  presents today to discuss:     History of Present Illness  The patient presents for evaluation of abnormal stools.    The patient has been observing abnormal stools for the last few weeks.  A virtual consultation with a healthcare provider conducted one month ago suggested a wait-and-see approach if the condition did not resolve within a two-week period. The occurrence of these symptoms is sporadic, initially believed to be a result of tofu. The stool, initially resembled a ball, has since evolved into small, tan specks. The patient denies any associated symptoms such as abdominal pain, hematochezia, fever, chills, nausea, or vomiting. The patient also reports occasional diarrhea. Denies changing her diet recently.     Supplemental information:  Reports her congestion and wheezing have resolved.    ROS:  See HPI    Medications, allergies, past medical history, family history, surgical history, and social history documented in chart and reviewed by me.       Objective:   Exam:  /70 (BP Location: Left arm, Patient Position: Sitting, BP Cuff Size: Adult)   Pulse 66   Temp 36.8 °C (98.3 °F) (Temporal)   Resp 16   Ht 1.626 m (5' 4\")   Wt 53.4 kg (117 lb 12.8 oz)   LMP 04/27/2024 Comment: IRREGULAR WITH IUD  SpO2 96%   BMI 20.22 kg/m²      Physical Exam  Constitutional:       General: She is not in acute distress.     Appearance: Normal appearance.   HENT:      Head: Normocephalic and atraumatic.   Eyes:      General: No scleral icterus.  Neck:      Thyroid: No thyroid mass or thyromegaly.   Cardiovascular:      Rate and Rhythm: Normal rate and " regular rhythm.      Heart sounds: Normal heart sounds. No murmur heard.  Pulmonary:      Effort: Pulmonary effort is normal.      Breath sounds: Normal breath sounds. No wheezing.   Abdominal:      General: Bowel sounds are normal. There is no distension.      Palpations: Abdomen is soft. There is no mass.      Tenderness: There is no abdominal tenderness.   Musculoskeletal:         General: No swelling. Normal range of motion.      Cervical back: Neck supple.   Skin:     General: Skin is warm and dry.   Neurological:      General: No focal deficit present.      Mental Status: She is alert and oriented to person, place, and time.      Gait: Gait normal.   Psychiatric:         Mood and Affect: Mood normal.         Behavior: Behavior normal.         Thought Content: Thought content normal.         Judgment: Judgment normal.              Assessment & Plan:       Assessment & Plan    1. Abnormal stools  Acute.  Unknown etiology.  The patient's liver enzymes are normal. CBC does not demonstrate leukocytosis. Stool studies have been ordered to investigate potential  parasitic infections. Should she experience abdominal pain, nausea, or vomiting, she is to inform me immediately. At that point, imaging of her stomach, pancreas, liver, and gallbladder will be conducted.  Stool test are normal and if symptoms persist will refer to GI for further workup.  - Complete O&P; Future  - GIARDIA: DIRECT EIA    2. Encounter for administration of vaccine  - Gardasil 9    3. Hypokalemia  Acute.  New finding. Her potassium level is at the borderline of normal at 3.5. The patient has been advised to incorporate more potassium into her diet, such as bananas and raisins. A repeat of her potassium levels will be conducted.  - ELECTROLYTES; Future   Latest Reference Range & Units 05/29/24 07:59   Potassium 3.6 - 5.5 mmol/L 3.5 (L)   (L): Data is abnormally low    4. Vitamin D deficiency  Acute. New finding. Vitamin D supplementation has been  recommended, with a dosage of 1000 international units to be taken with meals.    Latest Reference Range & Units 05/29/24 07:59   25-Hydroxy   Vitamin D 25 30 - 100 ng/mL 24 (L)   (L): Data is abnormally low    5. Encounter to discuss test results  CMP, A1C, CBC, TSH, lipid, Vitamin D, Hep C, and HIV reviewed today with pt. Will continue with yearly labs or sooner if indicated.                     Pt agreed with treatment plan and verbalized understanding.     Return in about 6 months (around 11/30/2024) for Annual Wellness Visit.     Please note that this dictation was created using voice recognition software. I have made every reasonable attempt to correct obvious errors, but I expect that there are errors of grammar and possibly content that I did not discover before finalizing the note.    Melissa Posey PA-C  [facility]

## 2024-05-31 DIAGNOSIS — R19.5 ABNORMAL STOOLS: ICD-10-CM

## 2024-06-03 ENCOUNTER — TELEPHONE (OUTPATIENT)
Dept: MEDICAL GROUP | Facility: IMAGING CENTER | Age: 39
End: 2024-06-03
Payer: COMMERCIAL

## 2024-06-03 NOTE — TELEPHONE ENCOUNTER
Pt call to follow up on her lab results.  She was concerned that one of test result did not result it yet. Ova & parasite Exam, Fecal

## 2024-06-05 LAB — OVA AND PARASITE, FECAL INTERPRETATION Q0595: NEGATIVE

## 2024-06-10 DIAGNOSIS — R19.5 ABNORMAL STOOLS: ICD-10-CM

## 2024-06-11 ENCOUNTER — HOSPITAL ENCOUNTER (OUTPATIENT)
Dept: LAB | Facility: MEDICAL CENTER | Age: 39
End: 2024-06-11
Attending: STUDENT IN AN ORGANIZED HEALTH CARE EDUCATION/TRAINING PROGRAM
Payer: COMMERCIAL

## 2024-06-11 DIAGNOSIS — E87.6 HYPOKALEMIA: ICD-10-CM

## 2024-06-11 LAB
ANION GAP SERPL CALC-SCNC: 12 MMOL/L (ref 7–16)
CHLORIDE SERPL-SCNC: 102 MMOL/L (ref 96–112)
CO2 SERPL-SCNC: 25 MMOL/L (ref 20–33)
POTASSIUM SERPL-SCNC: 4.1 MMOL/L (ref 3.6–5.5)
SODIUM SERPL-SCNC: 139 MMOL/L (ref 135–145)

## 2024-06-11 PROCEDURE — 80051 ELECTROLYTE PANEL: CPT

## 2024-06-11 PROCEDURE — 36415 COLL VENOUS BLD VENIPUNCTURE: CPT

## 2024-07-01 DIAGNOSIS — R11.0 NAUSEA: ICD-10-CM

## 2024-07-01 RX ORDER — ONDANSETRON 4 MG/1
4 TABLET, ORALLY DISINTEGRATING ORAL EVERY 6 HOURS PRN
Qty: 10 TABLET | Refills: 0 | Status: SHIPPED | OUTPATIENT
Start: 2024-07-01

## 2024-07-12 ENCOUNTER — TELEMEDICINE (OUTPATIENT)
Dept: BEHAVIORAL HEALTH | Facility: PSYCHIATRIC FACILITY | Age: 39
End: 2024-07-12
Payer: COMMERCIAL

## 2024-07-12 DIAGNOSIS — F19.94 SUBSTANCE INDUCED MOOD DISORDER (HCC): ICD-10-CM

## 2024-07-12 DIAGNOSIS — F41.1 GENERALIZED ANXIETY DISORDER: ICD-10-CM

## 2024-07-12 DIAGNOSIS — F90.0 ADHD (ATTENTION DEFICIT HYPERACTIVITY DISORDER), INATTENTIVE TYPE: ICD-10-CM

## 2024-07-12 PROCEDURE — 99214 OFFICE O/P EST MOD 30 MIN: CPT | Mod: GT | Performed by: STUDENT IN AN ORGANIZED HEALTH CARE EDUCATION/TRAINING PROGRAM

## 2024-07-12 RX ORDER — HYDROXYZINE 50 MG/1
50 TABLET, FILM COATED ORAL
Qty: 90 TABLET | Refills: 1 | Status: SHIPPED | OUTPATIENT
Start: 2024-07-12 | End: 2024-07-12

## 2024-07-12 RX ORDER — METHYLPHENIDATE HYDROCHLORIDE 40 MG/1
40 CAPSULE ORAL NIGHTLY
Qty: 30 CAPSULE | Refills: 0 | Status: SHIPPED | OUTPATIENT
Start: 2024-07-12 | End: 2024-08-11

## 2024-07-12 RX ORDER — METHYLPHENIDATE HYDROCHLORIDE 40 MG/1
40 CAPSULE ORAL NIGHTLY
Qty: 30 CAPSULE | Refills: 0 | Status: SHIPPED | OUTPATIENT
Start: 2024-08-12 | End: 2024-09-11

## 2024-07-12 RX ORDER — HYDROXYZINE 50 MG/1
50 TABLET, FILM COATED ORAL
Qty: 90 TABLET | Refills: 0 | Status: SHIPPED | OUTPATIENT
Start: 2024-07-12 | End: 2024-10-10

## 2024-07-12 ASSESSMENT — ENCOUNTER SYMPTOMS
SHORTNESS OF BREATH: 0
NERVOUS/ANXIOUS: 0
DIZZINESS: 0
MYALGIAS: 0
FEVER: 0
INSOMNIA: 0
HALLUCINATIONS: 0
DEPRESSION: 0

## 2024-07-12 ASSESSMENT — LIFESTYLE VARIABLES: SUBSTANCE_ABUSE: 0

## 2024-08-05 DIAGNOSIS — F41.1 GENERALIZED ANXIETY DISORDER: ICD-10-CM

## 2024-08-06 RX ORDER — HYDROXYZINE HYDROCHLORIDE 50 MG/1
50 TABLET, FILM COATED ORAL
Qty: 90 TABLET | Refills: 0 | Status: SHIPPED | OUTPATIENT
Start: 2024-08-06 | End: 2024-11-04

## 2024-09-10 DIAGNOSIS — F90.0 ADHD (ATTENTION DEFICIT HYPERACTIVITY DISORDER), INATTENTIVE TYPE: ICD-10-CM

## 2024-09-12 RX ORDER — METHYLPHENIDATE HYDROCHLORIDE 40 MG/1
40 CAPSULE ORAL NIGHTLY
Qty: 30 CAPSULE | Refills: 0 | Status: SHIPPED | OUTPATIENT
Start: 2024-09-12 | End: 2024-10-12

## 2024-10-11 ENCOUNTER — APPOINTMENT (OUTPATIENT)
Dept: BEHAVIORAL HEALTH | Facility: PSYCHIATRIC FACILITY | Age: 39
End: 2024-10-11
Payer: COMMERCIAL

## 2024-10-11 VITALS
SYSTOLIC BLOOD PRESSURE: 112 MMHG | DIASTOLIC BLOOD PRESSURE: 70 MMHG | HEART RATE: 77 BPM | BODY MASS INDEX: 20.14 KG/M2 | WEIGHT: 118 LBS | OXYGEN SATURATION: 97 % | HEIGHT: 64 IN

## 2024-10-11 DIAGNOSIS — F41.1 GENERALIZED ANXIETY DISORDER: ICD-10-CM

## 2024-10-11 DIAGNOSIS — F90.0 ADHD (ATTENTION DEFICIT HYPERACTIVITY DISORDER), INATTENTIVE TYPE: ICD-10-CM

## 2024-10-11 DIAGNOSIS — F19.94 SUBSTANCE INDUCED MOOD DISORDER (HCC): ICD-10-CM

## 2024-10-11 PROCEDURE — 3078F DIAST BP <80 MM HG: CPT | Performed by: STUDENT IN AN ORGANIZED HEALTH CARE EDUCATION/TRAINING PROGRAM

## 2024-10-11 PROCEDURE — 99214 OFFICE O/P EST MOD 30 MIN: CPT | Performed by: STUDENT IN AN ORGANIZED HEALTH CARE EDUCATION/TRAINING PROGRAM

## 2024-10-11 PROCEDURE — 3074F SYST BP LT 130 MM HG: CPT | Performed by: STUDENT IN AN ORGANIZED HEALTH CARE EDUCATION/TRAINING PROGRAM

## 2024-10-11 RX ORDER — METHYLPHENIDATE HYDROCHLORIDE 40 MG/1
40 CAPSULE ORAL NIGHTLY
Qty: 30 CAPSULE | Refills: 0 | Status: SHIPPED | OUTPATIENT
Start: 2024-10-11 | End: 2024-11-10

## 2024-10-11 RX ORDER — HYDROXYZINE HYDROCHLORIDE 50 MG/1
25 TABLET, FILM COATED ORAL
Qty: 45 TABLET | Refills: 0 | Status: SHIPPED | OUTPATIENT
Start: 2024-10-11 | End: 2025-01-09

## 2024-10-11 RX ORDER — METHYLPHENIDATE HYDROCHLORIDE 40 MG/1
40 CAPSULE ORAL
Qty: 30 CAPSULE | Refills: 0 | Status: SHIPPED | OUTPATIENT
Start: 2024-12-09 | End: 2025-01-08

## 2024-10-11 RX ORDER — METHYLPHENIDATE HYDROCHLORIDE 40 MG/1
40 CAPSULE ORAL
Qty: 30 CAPSULE | Refills: 0 | Status: SHIPPED | OUTPATIENT
Start: 2024-11-10 | End: 2024-12-10

## 2024-10-11 ASSESSMENT — ENCOUNTER SYMPTOMS
NERVOUS/ANXIOUS: 0
SHORTNESS OF BREATH: 0
INSOMNIA: 0
DEPRESSION: 0
FEVER: 0
DIZZINESS: 0
MYALGIAS: 0
HALLUCINATIONS: 0

## 2024-10-11 ASSESSMENT — LIFESTYLE VARIABLES: SUBSTANCE_ABUSE: 0

## 2024-10-11 ASSESSMENT — FIBROSIS 4 INDEX: FIB4 SCORE: 0.68

## 2024-11-15 ENCOUNTER — APPOINTMENT (OUTPATIENT)
Dept: BEHAVIORAL HEALTH | Facility: PSYCHIATRIC FACILITY | Age: 39
End: 2024-11-15
Payer: COMMERCIAL

## 2024-11-22 ENCOUNTER — OFFICE VISIT (OUTPATIENT)
Dept: BEHAVIORAL HEALTH | Facility: PSYCHIATRIC FACILITY | Age: 39
End: 2024-11-22
Payer: COMMERCIAL

## 2024-11-22 VITALS
BODY MASS INDEX: 19.81 KG/M2 | HEIGHT: 64 IN | DIASTOLIC BLOOD PRESSURE: 70 MMHG | SYSTOLIC BLOOD PRESSURE: 106 MMHG | WEIGHT: 116 LBS

## 2024-11-22 DIAGNOSIS — F19.94 SUBSTANCE INDUCED MOOD DISORDER (HCC): ICD-10-CM

## 2024-11-22 DIAGNOSIS — F41.1 GENERALIZED ANXIETY DISORDER: ICD-10-CM

## 2024-11-22 DIAGNOSIS — F90.0 ADHD (ATTENTION DEFICIT HYPERACTIVITY DISORDER), INATTENTIVE TYPE: ICD-10-CM

## 2024-11-22 PROCEDURE — 3078F DIAST BP <80 MM HG: CPT | Performed by: STUDENT IN AN ORGANIZED HEALTH CARE EDUCATION/TRAINING PROGRAM

## 2024-11-22 PROCEDURE — 99214 OFFICE O/P EST MOD 30 MIN: CPT | Performed by: STUDENT IN AN ORGANIZED HEALTH CARE EDUCATION/TRAINING PROGRAM

## 2024-11-22 PROCEDURE — 3074F SYST BP LT 130 MM HG: CPT | Performed by: STUDENT IN AN ORGANIZED HEALTH CARE EDUCATION/TRAINING PROGRAM

## 2024-11-22 ASSESSMENT — ENCOUNTER SYMPTOMS
SHORTNESS OF BREATH: 0
FEVER: 0
INSOMNIA: 0
DIZZINESS: 0
NERVOUS/ANXIOUS: 0
HALLUCINATIONS: 0
DEPRESSION: 0
MYALGIAS: 0
HEADACHES: 0
CHILLS: 0

## 2024-11-22 ASSESSMENT — FIBROSIS 4 INDEX: FIB4 SCORE: 0.68

## 2024-11-22 ASSESSMENT — LIFESTYLE VARIABLES: SUBSTANCE_ABUSE: 0

## 2024-11-22 NOTE — ASSESSMENT & PLAN NOTE
Patient's significant, debilitating ADHD symptoms of inattentive nature that she has been previously diagnosed and treated for prior has been improving on trialing of Jornay and greatly improved since titration. We increase dose due to some symptoms and continue to monitor pt's level of comfort level, cravings of substances, and caution against substance misuse - long delayed release of Jornay has certainly assisted with such.     Overall has had good effect at work and home life. Will monitor on increased dose for potential help with task switching, word finding etc.     Continuing to discuss and education patient regarding complex history of substance induced mood disorder, substance abuse history, medication options, side effects, and options to reach out for help including emergency and clinic numbers/my chart messages. Discussed rescue medicine and contingency plans.        -INCREASE Jornay 40mg to 60mg QHS for ADHD

## 2024-11-22 NOTE — ASSESSMENT & PLAN NOTE
Successfully tapered off gabapentin in past few months in light of improving anxiety and in addition to observe clearer picture of what impact stimulants may be having to treat ADHD that may lead to continued decreased anxiety/insomnia.  Continuing hydroxyzine with good effect, will keep assessing the potential necessity of it versus patient's anxiety of lack of sleep that could lead her to an episode of which she experienced in relation to substances/hospitalization in past. Takes hydroxyzine when she takes Jornay, potentially assist with sleep initiation with calming any anxious thoughts in evenings.    -Continue hydroxyzine 50mg QHS

## 2024-11-22 NOTE — ASSESSMENT & PLAN NOTE
Resolved, stable    Have discussed rescue medications/analyizing and recognizing and triggers/susceptible times for her     Hx of postpartum psychotic sx (Pt has IUD, no plans of getting pregnant in the future, has safety measures in place in case it does happen)    Therapist (weekly/biweekly): [facility]    Goes to [facility] [place] [facility] and has sponsor

## 2024-11-22 NOTE — PROGRESS NOTES
[facility] PSYCHIATRIC FOLLOW UP NOTE    Evaluation completed by: Danay Choudhary D.O.   Date of Service: 11/22/2024  Appointment type: In person  Attending:  Dr. Yadi Matthew M.D.   Information below was collected from: Patient    CHIEF COMPLIANT:  Medication Management      HPI:   Tonya Hillman is a 38 y.o. old female who presents today for regularly scheduled follow up for assessment of Medication Management       Patient was last seen on 10/11/2024 at which time plan was to:   -Continue hydroxyzine 50mg QHS  -CONTINUE Jornay 40mg QHS for ADHD    Today, patient reports she has been doing well and continues to be stable. Has had good regulation of her ADHD sx, taking it consistently at around 8PM~. Denies euphoria, crashing, medication side effects.     Would like to trial at increased dose, as although she has had great ADHD symptom relief, has still noticed some distractability and difficulty task switching that has not impacted her extremely significantly but would like to trial a higher dose for potential increased efficacy. Went through extensive psychoeducation together again about rescue medication and any potential even subclinical mood sx and who to call and where to go I.e. ER etc.     Denies cravings or consumption of any alcohol or illicit substances. No concern for any potential manic/psychotic sx as detailed below.  using marijuana has not had an impact on her and she continues to go to AA meetings and has regularly talks with her sponsor in addition to therapist.    Has been taking hydroxyzine 50mg QHS without issue and good effect to calm herself down and get to sleep/stay asleep, also helps with slowing down thoughts and easier to relax, also might think that it could be placebo but is not sure.      PSYCHIATRIC REVIEW OF SYSTEMS: current symptoms as reported by patient  Depression: Denies overt symptoms of depression such as marked depressed mood, anhedonia,  sleep disturbances   Jeanine: Denies overt symptoms of jeanine such as marked elevated mood, distractability, irritability, grandiosity, flight of ideas   Anxiety/Panic Attacks: Denies overt symptoms of anxiety such as increased restlessness, panic attacks   Trauma:  Patient reports no signs or symptoms indicative of PTSD   Psychosis: Patient reports no signs or symptoms indicative of psychosis   ADHD: DENIES the following since being on Jornay: fails to give close attention to details or makes careless mistakes in school, has difficulty sustaining attention in tasks or play activities, has difficulty organizing tasks and activities, loses things that are necessary for tasks and activities, is easily distracted by extraneous stimuli, is often forgetful in daily activities (see HPI for other subtle details that are not extensively impacting her)  Substance Use Disorder: Denies alcohol or illicit substance use    REVIEW OF SYSTEMS   Review of Systems   Constitutional:  Negative for chills and fever.   Respiratory:  Negative for shortness of breath.    Cardiovascular:  Negative for chest pain.   Musculoskeletal:  Negative for myalgias.   Neurological:  Negative for dizziness and headaches.   Psychiatric/Behavioral:  Negative for depression, hallucinations, substance abuse and suicidal ideas. The patient is not nervous/anxious and does not have insomnia.      Neurologic: no tics, tremors, dyskinesias. The patient denies dizzniess, syncope, falls. Ambulates independently    PSYCHIATRIC EXAMINATION   Musculoskeletal: No abnormal movements noted and wnl  Appearance:  well-developed, well-nourished, appears stated age, fair hygiene, no apparent distress, and appropriately dressed, cooperative, engaged, friendly, and pleasant  Thought Process:   linear, coherent, goal-oriented, and organized  Abnormal or Psychotic Thoughts: Denies SI, denies HI, and no overt delusions noted and No overt delusions noted  SI/HI: Denies SI and  "HI  Speech: regular rate, rhythm, volume, tone, and syntax, coherent, and spontaneous  Mood: \"Okay!\"  Affect: euthymic and congruent with mood  Orientation: alert and oriented  Recent and Remote Memory: no gross impairment in immediate, recent, or remote memory  Attention Span and Concentration: Intact  Insight/Judgement into symptoms: good  Neurological Testing (MSSE Score and/or clock drawing): MMSE not performed during this encounter  /70 (BP Location: Left arm, Patient Position: Sitting, BP Cuff Size: Adult)   Ht 1.626 m (5' 4\")   Wt 52.6 kg (116 lb)   BMI 19.91 kg/m²     PAST MEDICAL HISTORY  No past medical history on file.  No Known Allergies  Past Surgical History:   Procedure Laterality Date    BREAST RECONSTRUCTION Bilateral 02/14/2019    Breast augmentation      Family History   Problem Relation Age of Onset    No Known Problems Mother     No Known Problems Father     Multiple Sclerosis Maternal Grandmother     Cancer Paternal Grandmother 92        Breast    Diabetes Paternal Grandfather      Social History     Socioeconomic History    Marital status:    Tobacco Use    Smoking status: Never    Smokeless tobacco: Never   Vaping Use    Vaping status: Never Used   Substance and Sexual Activity    Alcohol use: Not Currently    Drug use: Never    Sexual activity: Yes     Partners: Male     Birth control/protection: I.U.D.     Past Surgical History:   Procedure Laterality Date    BREAST RECONSTRUCTION Bilateral 02/14/2019    Breast augmentation     CURRENT MEDICATIONS    Current Outpatient Medications:     Methylphenidate HCl ER, PM, 60 MG CAPSULE SR 24 HR, Take 60 mg by mouth at bedtime for 30 days., Disp: 30 Capsule, Rfl: 0    hydrOXYzine HCl (ATARAX) 50 MG Tab, Take 0.5 Tablets by mouth at bedtime for 90 days., Disp: 45 Tablet, Rfl: 0    Methylphenidate HCl ER, PM, (JORNAY PM) 40 MG CAPSULE SR 24 HR, Take 40 mg by mouth at bedtime for 30 days., Disp: 30 Capsule, Rfl: 0    [START ON 12/9/2024] " Methylphenidate HCl ER, PM, (JORNAY PM) 40 MG CAPSULE SR 24 HR, Take 40 mg by mouth at bedtime for 30 days., Disp: 30 Capsule, Rfl: 0    ondansetron (ZOFRAN ODT) 4 MG TABLET DISPERSIBLE, Take 1 Tablet by mouth every 6 hours as needed for Nausea/Vomiting., Disp: 10 Tablet, Rfl: 0    albuterol 108 (90 Base) MCG/ACT Aero Soln inhalation aerosol, Inhale 2 Puffs every four hours as needed for Shortness of Breath., Disp: 1 Each, Rfl: 0    tretinoin (RETIN-A) 0.1 % cream, APPLY A THIN LAYER TO THE AFFECTED AREA(S) NIGHTLY AT BEDTIME. START WITH EVERY OTHER NIGHT AND INCREASE TO NIGHTLY AS TOLERATED, Disp: , Rfl:     Levonorgestrel (LEWIS) 13.5 MG IUD, by Intrauterine route., Disp: , Rfl:   SCREENINGS      11/22/2023     9:00 AM 4/29/2024    11:20 AM 5/15/2024     9:00 AM   Depression Screen (PHQ-2/PHQ-9)   PHQ-2 Total Score 0 0 0         5/15/2024     9:14 AM 11/22/2023    10:11 AM    EVELYN-7 ANXIETY SCALE FLOWSHEET   Feeling nervous, anxious, or on edge 2 2   Not being able to stop or control worrying 1 1   Worrying too much about different things 1 1   Trouble relaxing 1 2   Being so restless that it is hard to sit still 0 1   Becoming easily annoyed or irritable 1 2   Feeling afraid as if something awful might happen 0 1   EVELYN-7 Total Score 6 10   How difficult have these problems made it for you to do your work, take care of things at home, or get along with other people? Somewhat difficult Somewhat difficult     PREVENTATIVE CARE  Medication Monitoring: Stimulants: Reviewed height, weight, blood pressure, and pulse.  Signed Controlled Substance Agreement.       ASSESSMENT  Tonya Hillman is a 38 y.o. old female who presents today for regularly scheduled follow up for assessment of Medication Management  Details below    [place]   [place].  No concerns about misuse of controlled substance.    CURRENT RISK ASSESSMENT       Suicide: Low       Homicide: Low       Self-Harm: Low       Relapse: Low        Crisis Safety Plan Reviewed: Not indicated    DIAGNOSES/PLAN  Problem List Items Addressed This Visit          Psychiatry Problems    Substance induced mood disorder (HCC)     Resolved, stable    Have discussed rescue medications/analyizing and recognizing and triggers/susceptible times for her     Hx of postpartum psychotic sx (Pt has IUD, no plans of getting pregnant in the future, has safety measures in place in case it does happen)    Therapist (weekly/biweekly): Yary [facility] [employer]    Goes to [employer] [place] [facility] and has sponsor         Generalized anxiety disorder     Successfully tapered off gabapentin in past few months in light of improving anxiety and in addition to observe clearer picture of what impact stimulants may be having to treat ADHD that may lead to continued decreased anxiety/insomnia.  Continuing hydroxyzine with good effect, will keep assessing the potential necessity of it versus patient's anxiety of lack of sleep that could lead her to an episode of which she experienced in relation to substances/hospitalization in past. Takes hydroxyzine when she takes Jornay, potentially assist with sleep initiation with calming any anxious thoughts in evenings.    -Continue hydroxyzine 50mg QHS         ADHD (attention deficit hyperactivity disorder), inattentive type       Patient's significant, debilitating ADHD symptoms of inattentive nature that she has been previously diagnosed and treated for prior has been improving on trialing of Jornay and greatly improved since titration. We increase dose due to some symptoms and continue to monitor pt's level of comfort level, cravings of substances, and caution against substance misuse - long delayed release of Jornay has certainly assisted with such.     Overall has had good effect at work and home life. Will monitor on increased dose for potential help with task switching, word finding etc.     Continuing to discuss and education patient  regarding complex history of substance induced mood disorder, substance abuse history, medication options, side effects, and options to reach out for help including emergency and clinic numbers/my chart messages. Discussed rescue medicine and contingency plans.        -INCREASE Jornay 40mg to 60mg QHS for ADHD               Relevant Medications    Methylphenidate HCl ER, PM, 60 MG CAPSULE SR 24 HR              Medication options, alternatives (including no medications) and medication risks/benefits/side effects were discussed in detail.  The patient was advised to call, message clinician on [employer], or come in to the clinic if symptoms worsen or if questions/issues regarding their medications arise.  The patient verbalized understanding and agreement.    The patient was educated to call 911, call the suicide hotline, or go to the local ER if having thoughts of suicide or homicide.  The patient verbalized understanding and agreement.   The proposed treatment plan was discussed with the patient who was provided the opportunity to ask questions and make suggestions regarding alternative treatment. Patient verbalized understanding and expressed agreement with the plan.      Follow up in 1 month or sooner as needed.    This appointment was supervised by attending psychiatrist, Dr. Yadi Matthew M.D., who agrees with assessment and treatment plan.  See attending attestation for more details.

## 2025-01-10 DIAGNOSIS — F41.1 GAD (GENERALIZED ANXIETY DISORDER): ICD-10-CM

## 2025-01-10 RX ORDER — HYDROXYZINE HYDROCHLORIDE 50 MG/1
50 TABLET, FILM COATED ORAL
Qty: 90 TABLET | Refills: 1 | Status: SHIPPED | OUTPATIENT
Start: 2025-01-10 | End: 2025-07-09

## 2025-01-17 ENCOUNTER — TELEMEDICINE (OUTPATIENT)
Dept: BEHAVIORAL HEALTH | Facility: PSYCHIATRIC FACILITY | Age: 40
End: 2025-01-17
Payer: COMMERCIAL

## 2025-01-17 DIAGNOSIS — F90.0 ADHD (ATTENTION DEFICIT HYPERACTIVITY DISORDER), INATTENTIVE TYPE: ICD-10-CM

## 2025-01-17 DIAGNOSIS — F41.1 GENERALIZED ANXIETY DISORDER: ICD-10-CM

## 2025-01-17 DIAGNOSIS — F19.94 SUBSTANCE INDUCED MOOD DISORDER (HCC): ICD-10-CM

## 2025-01-17 PROCEDURE — 99214 OFFICE O/P EST MOD 30 MIN: CPT | Performed by: STUDENT IN AN ORGANIZED HEALTH CARE EDUCATION/TRAINING PROGRAM

## 2025-01-17 ASSESSMENT — ENCOUNTER SYMPTOMS
FEVER: 0
INSOMNIA: 0
HEADACHES: 0
SHORTNESS OF BREATH: 0
DEPRESSION: 0
MYALGIAS: 0
NERVOUS/ANXIOUS: 0
HALLUCINATIONS: 0
DIZZINESS: 0
CHILLS: 0

## 2025-01-17 ASSESSMENT — LIFESTYLE VARIABLES: SUBSTANCE_ABUSE: 0

## 2025-01-17 NOTE — ASSESSMENT & PLAN NOTE
Patient's significant, debilitating ADHD symptoms of inattentive nature that she has been previously diagnosed and treated for prior has been improving on trialing of Jornay and greatly improved since titration. Was dx in adult arreaga but had significant inattentive sx since elementary school.     Overall increased dose has had good effect at work and home life. Will  continue dose today.     Continuing to discuss and education patient regarding complex history of substance induced mood disorder, substance abuse history, medication options, side effects, and options to reach out for help including emergency and clinic numbers/my chart messages. Discussed rescue medicine and contingency plans.        -CONTINUE Jornay 60mg QHS for ADHD

## 2025-01-17 NOTE — PROGRESS NOTES
[facility] PSYCHIATRIC FOLLOW UP NOTE    Evaluation completed by: Danay Choudhary D.O.   Date of Service: 01/17/2025  Appointment type: Video  Patient was presented for a telehealth consultation via secure and encrypted videoconferencing technology.    Attending:  Dr. Yadi Matthew M.D.   Information below was collected from: Patient    CHIEF COMPLIANT:  Medication Management      HPI:   Tonya Hillman is a 39 y.o. old female who presents today for regularly scheduled follow up for assessment of Medication Management       Patient was last seen on 11/2024 at which time plan was to:   -Continue hydroxyzine 50mg QHS  -CONTINUE Jornay 40mg QHS for ADHD    Asked for virtual today, as she is traveling with her family to go to [place]. Was also notified of this writer's next available in April which she expressed comfortableness with due to knowing other providers/attendings in the clinic if she needed any assistance in the meantime will also will be given refills and was informed of surrogate provider as well.    Today, patient reports she has been doing well and continues to be stable. Has had good regulation of her ADHD sx, taking it consistently at around 8PM~. Denies euphoria, crashing, other medication side effects such as HA, GI sx.     Patient overall does not feel very different but describes increase as that it's working better. She has seen improvements with short term memory, distractability in work, difficulty task switching. Would like to continue on current dose, as it has noticeably helped with ADHD symptom relief.    Working on decreasing caffeine, currently drinking about 3 caffeine cups/day.     Has been taking hydroxyzine 50mg QHS without issue and good effect to calm herself down and get to sleep/stay asleep, also helps with slowing down thoughts and easier to relax, in addition to I.e. plane rides.    Denies cravings or consumption of any alcohol or illicit  "substances. No concern for any potential manic/psychotic sx as detailed below. She continues to go to AA meetings and has regularly talks with her sponsor in addition to her regular therapist (working on acceptance of feelings).  Patient continues to exercise 4x week of HIIT at [employer], states that this continues to help with ADHD and helps her mental health in \"so many ways\". Encouraged positive habits.      PSYCHIATRIC REVIEW OF SYSTEMS: current symptoms as reported by patient  Depression: Denies overt symptoms of depression such as marked depressed mood, anhedonia, sleep disturbances (sleeps at least 8h+ at night)  Jeanine: Denies overt symptoms of jeanine such as marked elevated mood, distractability, irritability, grandiosity, flight of ideas   Anxiety/Panic Attacks: Denies overt symptoms of anxiety such as increased restlessness, panic attacks   Trauma:  Patient reports no signs or symptoms indicative of PTSD   Psychosis: Patient reports no signs or symptoms indicative of psychosis   ADHD: DENIES the following since being on Jornay: fails to give close attention to details or makes careless mistakes in school, has difficulty sustaining attention in tasks or play activities, has difficulty organizing tasks and activities, loses things that are necessary for tasks and activities, is easily distracted by extraneous stimuli, is often forgetful in daily activities (see HPI for other subtle details that are not extensively impacting her)  Substance Use Disorder: Denies alcohol or illicit substance use    REVIEW OF SYSTEMS   Review of Systems   Constitutional:  Negative for chills and fever.   Respiratory:  Negative for shortness of breath.    Cardiovascular:  Negative for chest pain.   Musculoskeletal:  Negative for myalgias.   Neurological:  Negative for dizziness and headaches.   Psychiatric/Behavioral:  Negative for depression, hallucinations, substance abuse and suicidal ideas. The patient is not " "nervous/anxious and does not have insomnia.      Neurologic: no tics, tremors, dyskinesias. The patient denies dizzniess, syncope, falls. Ambulates independently    PSYCHIATRIC EXAMINATION   Musculoskeletal: No abnormal movements noted and wnl  Appearance:  well-developed, well-nourished, appears stated age, fair hygiene, no apparent distress, and appropriately dressed, cooperative, engaged, friendly, and pleasant  Thought Process:   linear, coherent, goal-oriented, and organized  Abnormal or Psychotic Thoughts: Denies SI, denies HI, and no overt delusions noted and No overt delusions noted  SI/HI: Denies SI and HI  Speech: regular rate, rhythm, volume, tone, and syntax, coherent, and spontaneous  Mood: \"Great\"  Affect: euthymic and congruent with mood  Orientation: alert and oriented  Recent and Remote Memory: no gross impairment in immediate, recent, or remote memory  Attention Span and Concentration: Intact  Insight/Judgement into symptoms: good  Neurological Testing (MSSE Score and/or clock drawing): MMSE not performed during this encounter  There were no vitals taken for this visit.    PAST MEDICAL HISTORY  No past medical history on file.  No Known Allergies  Past Surgical History:   Procedure Laterality Date    BREAST RECONSTRUCTION Bilateral 02/14/2019    Breast augmentation      Family History   Problem Relation Age of Onset    No Known Problems Mother     No Known Problems Father     Multiple Sclerosis Maternal Grandmother     Cancer Paternal Grandmother 92        Breast    Diabetes Paternal Grandfather      Social History     Socioeconomic History    Marital status:    Tobacco Use    Smoking status: Never    Smokeless tobacco: Never   Vaping Use    Vaping status: Never Used   Substance and Sexual Activity    Alcohol use: Not Currently    Drug use: Never    Sexual activity: Yes     Partners: Male     Birth control/protection: I.U.D.     Past Surgical History:   Procedure Laterality Date    BREAST " RECONSTRUCTION Bilateral 02/14/2019    Breast augmentation     CURRENT MEDICATIONS    Current Outpatient Medications:     Methylphenidate HCl ER, PM, 60 MG CAPSULE SR 24 HR, Take 60 mg by mouth at bedtime for 30 days., Disp: 30 Capsule, Rfl: 0    hydrOXYzine HCl (ATARAX) 50 MG Tab, Take 1 Tablet by mouth at bedtime for 180 days., Disp: 90 Tablet, Rfl: 1    Methylphenidate HCl ER, PM, 60 MG CAPSULE SR 24 HR, Take 60 mg by mouth at bedtime for 30 days., Disp: 30 Capsule, Rfl: 0    [START ON 2/20/2025] Methylphenidate HCl ER, PM, 60 MG CAPSULE SR 24 HR, Take 60 mg by mouth at bedtime for 30 days., Disp: 30 Capsule, Rfl: 0    [START ON 3/20/2025] Methylphenidate HCl ER, PM, (JORNAY PM) 60 MG CAPSULE SR 24 HR, Take 60 mg by mouth at bedtime for 30 days., Disp: 30 Capsule, Rfl: 0    ondansetron (ZOFRAN ODT) 4 MG TABLET DISPERSIBLE, Take 1 Tablet by mouth every 6 hours as needed for Nausea/Vomiting., Disp: 10 Tablet, Rfl: 0    albuterol 108 (90 Base) MCG/ACT Aero Soln inhalation aerosol, Inhale 2 Puffs every four hours as needed for Shortness of Breath., Disp: 1 Each, Rfl: 0    tretinoin (RETIN-A) 0.1 % cream, APPLY A THIN LAYER TO THE AFFECTED AREA(S) NIGHTLY AT BEDTIME. START WITH EVERY OTHER NIGHT AND INCREASE TO NIGHTLY AS TOLERATED, Disp: , Rfl:     Levonorgestrel (LEWIS) 13.5 MG IUD, by Intrauterine route., Disp: , Rfl:   SCREENINGS      11/22/2023     9:00 AM 4/29/2024    11:20 AM 5/15/2024     9:00 AM   Depression Screen (PHQ-2/PHQ-9)   PHQ-2 Total Score 0 0 0         5/15/2024     9:14 AM 11/22/2023    10:11 AM    EVELYN-7 ANXIETY SCALE FLOWSHEET   Feeling nervous, anxious, or on edge 2 2   Not being able to stop or control worrying 1 1   Worrying too much about different things 1 1   Trouble relaxing 1 2   Being so restless that it is hard to sit still 0 1   Becoming easily annoyed or irritable 1 2   Feeling afraid as if something awful might happen 0 1   EVELYN-7 Total Score 6 10   How difficult have these problems  made it for you to do your work, take care of things at home, or get along with other people? Somewhat difficult Somewhat difficult     PREVENTATIVE CARE  Medication Monitoring: Stimulants: Reviewed height, weight, blood pressure, and pulse.  Signed Controlled Substance Agreement.       ASSESSMENT  Tonya Hillman is a 38 y.o. old female who presents today for regularly scheduled follow up for assessment of Medication Management    Details below    [place]  records   [place].  No concerns about misuse of controlled substance.    CURRENT RISK ASSESSMENT       Suicide: Low       Homicide: Low       Self-Harm: Low       Relapse: Low       Crisis Safety Plan Reviewed: Not indicated    DIAGNOSES/PLAN  Problem List Items Addressed This Visit          Psychiatry Problems    Substance induced mood disorder (HCC)     Resolved, stable    Have discussed rescue medications/analyizing and recognizing and triggers/susceptible times for her     Hx of postpartum psychotic sx (Pt has IUD, no plans of getting pregnant in the future, has safety measures in place in case it does happen)    Therapist (weekly/biweekly): Yary [facility] [employer]    Goes to [employer] [facility] and has sponsor         Generalized anxiety disorder     Successfully tapered off gabapentin in 2024 in light of improving anxiety and in addition to observe clearer picture of what impact stimulants may be having to treat ADHD that may lead to continued decreased anxiety/insomnia.  Continuing hydroxyzine with good effect, will keep assessing the potential necessity of it versus patient's anxiety of lack of sleep that could lead her to an episode of which she experienced in relation to substances/hospitalization in past. Takes hydroxyzine when she takes Jornay, potentially assist with sleep initiation with calming any anxious thoughts in evenings.    -Continue hydroxyzine 50mg QHS         ADHD (attention deficit hyperactivity disorder),  inattentive type       Patient's significant, debilitating ADHD symptoms of inattentive nature that she has been previously diagnosed and treated for prior has been improving on trialing of Jornay and greatly improved since titration. Was dx in adult [place] but had significant inattentive sx since elementary school.     Overall increased dose has had good effect at work and home life. Will  continue dose today.     Continuing to discuss and education patient regarding complex history of substance induced mood disorder, substance abuse history, medication options, side effects, and options to reach out for help including emergency and clinic numbers/my chart messages. Discussed rescue medicine and contingency plans.        -CONTINUE Jornay 60mg QHS for ADHD               Relevant Medications    Methylphenidate HCl ER, PM, 60 MG CAPSULE SR 24 HR     Medication options, alternatives (including no medications) and medication risks/benefits/side effects were discussed in detail.  The patient was advised to call, message clinician on [employer], or come in to the clinic if symptoms worsen or if questions/issues regarding their medications arise.  The patient verbalized understanding and agreement.    The patient was educated to call 911, call the suicide hotline, or go to the local ER if having thoughts of suicide or homicide.  The patient verbalized understanding and agreement.   The proposed treatment plan was discussed with the patient who was provided the opportunity to ask questions and make suggestions regarding alternative treatment. Patient verbalized understanding and expressed agreement with the plan.      Follow up in 1 month or sooner as needed.    This appointment was supervised by attending psychiatrist, Dr. Yadi Matthew M.D., who agrees with assessment and treatment plan.  See attending attestation for more details.

## 2025-01-17 NOTE — ASSESSMENT & PLAN NOTE
Resolved, stable    Have discussed rescue medications/analyizing and recognizing and triggers/susceptible times for her     Hx of postpartum psychotic sx (Pt has IUD, no plans of getting pregnant in the future, has safety measures in place in case it does happen)    Therapist (weekly/biweekly): Yary Ace [employer]    Goes to [employer] [facility] and has sponsor
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
## 2025-01-20 ENCOUNTER — TELEPHONE (OUTPATIENT)
Dept: BEHAVIORAL HEALTH | Facility: PSYCHIATRIC FACILITY | Age: 40
End: 2025-01-20
Payer: COMMERCIAL

## 2025-01-31 ENCOUNTER — HOSPITAL ENCOUNTER (EMERGENCY)
Facility: MEDICAL CENTER | Age: 40
End: 2025-02-01
Attending: STUDENT IN AN ORGANIZED HEALTH CARE EDUCATION/TRAINING PROGRAM
Payer: COMMERCIAL

## 2025-01-31 DIAGNOSIS — F30.9 MANIA (HCC): ICD-10-CM

## 2025-01-31 DIAGNOSIS — F31.13 BIPOLAR DISORDER, CURRENT EPISODE MANIC WITHOUT PSYCHOTIC FEATURES, SEVERE (HCC): ICD-10-CM

## 2025-01-31 DIAGNOSIS — R45.851 SUICIDAL IDEATION: ICD-10-CM

## 2025-01-31 PROCEDURE — 96372 THER/PROPH/DIAG INJ SC/IM: CPT

## 2025-01-31 PROCEDURE — 99285 EMERGENCY DEPT VISIT HI MDM: CPT

## 2025-01-31 ASSESSMENT — FIBROSIS 4 INDEX: FIB4 SCORE: 0.68

## 2025-02-01 VITALS
HEART RATE: 78 BPM | BODY MASS INDEX: 19.9 KG/M2 | WEIGHT: 115.96 LBS | SYSTOLIC BLOOD PRESSURE: 116 MMHG | OXYGEN SATURATION: 98 % | DIASTOLIC BLOOD PRESSURE: 70 MMHG | TEMPERATURE: 98.8 F | RESPIRATION RATE: 20 BRPM

## 2025-02-01 LAB — POC BREATHALIZER: 0 PERCENT (ref 0–0.01)

## 2025-02-01 PROCEDURE — A9270 NON-COVERED ITEM OR SERVICE: HCPCS | Performed by: EMERGENCY MEDICINE

## 2025-02-01 PROCEDURE — 700111 HCHG RX REV CODE 636 W/ 250 OVERRIDE (IP): Performed by: STUDENT IN AN ORGANIZED HEALTH CARE EDUCATION/TRAINING PROGRAM

## 2025-02-01 PROCEDURE — 700102 HCHG RX REV CODE 250 W/ 637 OVERRIDE(OP): Performed by: STUDENT IN AN ORGANIZED HEALTH CARE EDUCATION/TRAINING PROGRAM

## 2025-02-01 PROCEDURE — 700102 HCHG RX REV CODE 250 W/ 637 OVERRIDE(OP): Performed by: EMERGENCY MEDICINE

## 2025-02-01 PROCEDURE — 302970 POC BREATHALIZER: Performed by: STUDENT IN AN ORGANIZED HEALTH CARE EDUCATION/TRAINING PROGRAM

## 2025-02-01 PROCEDURE — A9270 NON-COVERED ITEM OR SERVICE: HCPCS | Performed by: STUDENT IN AN ORGANIZED HEALTH CARE EDUCATION/TRAINING PROGRAM

## 2025-02-01 RX ORDER — HALOPERIDOL 5 MG/ML
5 INJECTION INTRAMUSCULAR ONCE
Status: COMPLETED | OUTPATIENT
Start: 2025-02-01 | End: 2025-02-01

## 2025-02-01 RX ORDER — LORAZEPAM 1 MG/1
2 TABLET ORAL ONCE
Status: COMPLETED | OUTPATIENT
Start: 2025-02-01 | End: 2025-02-01

## 2025-02-01 RX ORDER — LORAZEPAM 1 MG/1
1 TABLET ORAL ONCE
Status: COMPLETED | OUTPATIENT
Start: 2025-02-01 | End: 2025-02-01

## 2025-02-01 RX ORDER — MIDAZOLAM HYDROCHLORIDE 5 MG/ML
5 INJECTION INTRAMUSCULAR; INTRAVENOUS ONCE
Status: COMPLETED | OUTPATIENT
Start: 2025-02-01 | End: 2025-02-01

## 2025-02-01 RX ORDER — HALOPERIDOL 5 MG/ML
5 INJECTION INTRAMUSCULAR
Status: DISCONTINUED | OUTPATIENT
Start: 2025-02-01 | End: 2025-02-01 | Stop reason: HOSPADM

## 2025-02-01 RX ORDER — MIDAZOLAM HYDROCHLORIDE 5 MG/ML
2.5 INJECTION INTRAMUSCULAR; INTRAVENOUS ONCE
Status: COMPLETED | OUTPATIENT
Start: 2025-02-01 | End: 2025-02-01

## 2025-02-01 RX ORDER — MIDAZOLAM HYDROCHLORIDE 5 MG/ML
5 INJECTION INTRAMUSCULAR; INTRAVENOUS
Status: DISCONTINUED | OUTPATIENT
Start: 2025-02-01 | End: 2025-02-01 | Stop reason: HOSPADM

## 2025-02-01 RX ORDER — LORAZEPAM 1 MG/1
1 TABLET ORAL ONCE
Status: DISCONTINUED | OUTPATIENT
Start: 2025-02-01 | End: 2025-02-01

## 2025-02-01 RX ORDER — DIPHENHYDRAMINE HYDROCHLORIDE 50 MG/ML
25 INJECTION INTRAMUSCULAR; INTRAVENOUS ONCE
Status: COMPLETED | OUTPATIENT
Start: 2025-02-01 | End: 2025-02-01

## 2025-02-01 RX ADMIN — LORAZEPAM 1 MG: 1 TABLET ORAL at 10:27

## 2025-02-01 RX ADMIN — LORAZEPAM 2 MG: 1 TABLET ORAL at 00:51

## 2025-02-01 RX ADMIN — DIPHENHYDRAMINE HYDROCHLORIDE 25 MG: 50 INJECTION, SOLUTION INTRAMUSCULAR; INTRAVENOUS at 03:49

## 2025-02-01 RX ADMIN — HALOPERIDOL LACTATE 5 MG: 5 INJECTION, SOLUTION INTRAMUSCULAR at 03:49

## 2025-02-01 RX ADMIN — MIDAZOLAM 2.5 MG: 5 INJECTION INTRAMUSCULAR; INTRAVENOUS at 01:58

## 2025-02-01 RX ADMIN — MIDAZOLAM 5 MG: 5 INJECTION INTRAMUSCULAR; INTRAVENOUS at 03:49

## 2025-02-01 NOTE — CONSULTS
Alert Team:    Legal hold certified by ERP. Inpatient psychiatric referrals faxed to [facility], [facility] and [facility].

## 2025-02-01 NOTE — ED NOTES
Pt restfull, cooperative, sitter remains. Aware of pending transfer to [facility] at 12 noon. No breakfast tray recvd-kitchen called regarding same

## 2025-02-01 NOTE — ED NOTES
Psych doctor called but pt received loads of medication unable to speak. Psych doctor was aware and told if patient is awake will call back.

## 2025-02-01 NOTE — DISCHARGE PLANNING
Alert team:    Attempted to speak with pt for behavioral health consult but she cannot stay away at this time due to medications given. Will attempt once pt wakes up.

## 2025-02-01 NOTE — ED NOTES
Carol present for transfer to [facility]  Original legal with staff  Pt belongings wit carol.  Pt calm and co operative at time of transfer

## 2025-02-01 NOTE — DISCHARGE PLANNING
[facility] ED Behavioral Health Fax Referral      Referral: Legal Hold    Intervention: Patient referral to community inpatient  facillity    Legal Hold Initiated: Date: 02/01/2025 Time: 0337    Patient’s Insurance Listed on Face Sheet: Mo    Referrals sent to:  [facility], [facility], [facility]     Referrals faxed by KAYLEIGH Shukla    This referral contains the following information:  Face sheet __x__  Page 1 and Page 2 of Legal Hold __x__  Alert Team Assessment/Psych Assessment ____  Head to toe physical exam __x__  Urine Drug Screen ____  Blood Alcohol __x__  Vital signs __x__  Pregnancy test when applicable ___  Medications list _x___  Covid screening ____    Plan: Patient will transfer to mental health facility once acceptance is obtained    For all referral information, please contact the  referral office daily between the hours of 7:00 a.m. to 6:00 p.m. at:   Phone [phone]   Fax [phone]    ED  Alert Team   Phone [phone] Fax [phone]    [facility] Emergency Department Contact numbers:  Green Pod [phone]   Blue Pod [phone]   Red Pod [phone]   Pediatrics [phone]

## 2025-02-01 NOTE — ED NOTES
"Breakfast tray provided-pt requesting something for \"heightening\". Erp aware, orders recvd for same and carried out  "

## 2025-02-01 NOTE — DISCHARGE SUMMARY
ED Observation Discharge Summary    Patient:Tonya Hillman  Patient : 1985  Patient MRN: [id number]  Patient PCP: Melissa Posey P.A.-C.    Admit Date: 2025  Discharge Date and Time: 25 12 PM  Discharge Diagnosis: Manic behavior, suicidal ideation  Discharge Attending: Kathy Dodd M.D.  Discharge Service: ED Observation    ED Course  Tonya is a 39 y.o. person who was evaluated at [facility] for evaluation of suicidal ideation and manic behavior.  Patient was paranoid that people are drugging her.  She required medication to help her calm down last night was very agitated.  This morning patient is still paranoid questioning whether or not I am the doctor and why she is on a legal hold.  She is asking if people on staff are drinking.  Patient is demonstrating paranoia and ongoing manic behavior.  She remained on a legal hold and was accepted at [facility] for ongoing psychiatric care.  Patient transferred to [facility] and stable condition.    Discharge Exam:  /70   Pulse 78   Temp 37.1 °C (98.8 °F) (Temporal)   Resp 20   Wt 52.6 kg (115 lb 15.4 oz)   SpO2 98%   BMI 19.90 kg/m² .    Constitutional: Awake and alert. Nontoxic  HENT:  Grossly normal  Eyes: Grossly normal  Neck: Normal range of motion  Cardiovascular: Normal heart rate   Thorax & Lungs: No respiratory distress  Skin:  No pathologic rash.   Extremities: Well perfused  Psychiatric: Affect normal    Labs  Results for orders placed or performed during the hospital encounter of 25   POC BREATHALIZER    Collection Time: 25 12:40 AM   Result Value Ref Range    POC Breathalizer 0.00 0.00 - 0.01 Percent       Radiology  No orders to display       Medications:   New Prescriptions    No medications on file       My final assessment includes suicidal ideation, manic behavior, paranoia  Upon Reevaluation, the patient's condition has: Improved; and will be  discharged.    Patient discharged from ED Observation status at 12 PM on 2/1/2025    Total time spent on this ED Observation discharge encounter is < 30 Minutes    Electronically signed by: Kathy Dodd M.D., 2/1/2025 12 PM

## 2025-02-01 NOTE — ED NOTES
[facility]'s called in regarding possible acceptance of pt. Request urine spec as previously ordered. Sitter aware of same

## 2025-02-01 NOTE — ED NOTES
Call placed to spouse alex per pt request and erp approval. Pt spoke with same as well as this rn who provided update to spouse regarding poc

## 2025-02-01 NOTE — ED NOTES
Jessica called from [facility]-states pt accepted by alert time. Vs obtained, sitter remains. Aware of need for urine spec   Statement Selected
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
## 2025-02-01 NOTE — ED NOTES
Per pt request and erp approval, call placed to spouse alex ([phone]) and pt able to speak to same

## 2025-02-01 NOTE — ED NOTES
"Redirected pt back to room, gave her warm blanket and advised her that she is in a safe space; this RN spoke w/ pt sister Raj on phone - pt sister confirmed bipolar dx and stated that pt goes into \"these episodes when she smokes weed\"     Pt did state that she has recently smoked weed.     Pt currently resting on [employer].   "

## 2025-02-01 NOTE — DISCHARGE PLANNING
Alert Team/Behavioral Health   Note:      Mental Health Transfer    Referral: transfer to mental health facility.    Intervention: Jessica at [facility] called to accept patient.    Patient's accepting provider is Jayden MIRANDA.    Transport arranged through Jelly at [facility].    The patient will be picked up @ 1200.    [facility] PCS Form scanned in .    Notified Attending Provider (Yousif CHILEL), Bedside RN (Geraldine NESBITT), Unit Clerk (Marylou ISBELL), Alert Team RN (Helen COOMBS) via Voalte message of the departure time as well as accepting facility.    Transfer packet to be created and placed in chart by [facility] staff.    Plan: transfer to [facility] via [facility] ambulance for acute inpatient psychiatric care.

## 2025-02-01 NOTE — ED TRIAGE NOTES
"Chief Complaint   Patient presents with    Psych Eval     Pt presents w/ running thoughts of  accusing her of being \"psycho\", states she was told by her  \"to go stay in a hotel for the last 3 days\"; pt admits to being bipolar but does not remember when she last took her meds      BP (!) 154/107   Pulse 88   Resp 20   Wt 52.6 kg (115 lb 15.4 oz)   SpO2 97%   BMI 19.90 kg/m²       "

## 2025-02-01 NOTE — ED NOTES
Pt having to be redirected to ER room; pacing in ER core. Pt has phone out, asked by ED staff and security to not record while in the ER.     Pt refusing meds and preg test at this time.

## 2025-02-07 ENCOUNTER — TELEPHONE (OUTPATIENT)
Dept: BEHAVIORAL HEALTH | Facility: PSYCHIATRIC FACILITY | Age: 40
End: 2025-02-07
Payer: COMMERCIAL

## 2025-02-07 DIAGNOSIS — F19.94 SUBSTANCE INDUCED MOOD DISORDER (HCC): ICD-10-CM

## 2025-02-07 DIAGNOSIS — F41.1 GAD (GENERALIZED ANXIETY DISORDER): ICD-10-CM

## 2025-02-07 RX ORDER — QUETIAPINE FUMARATE 50 MG/1
50 TABLET, EXTENDED RELEASE ORAL
Qty: 90 TABLET | Refills: 1 | Status: SHIPPED | OUTPATIENT
Start: 2025-02-07 | End: 2025-02-28 | Stop reason: DRUGHIGH

## 2025-02-07 RX ORDER — NYSTATIN 100000 [USP'U]/ML
500000 SUSPENSION ORAL 4 TIMES DAILY
Qty: 5 ML | Refills: 0 | Status: SHIPPED | OUTPATIENT
Start: 2025-02-07 | End: 2025-02-21

## 2025-02-07 RX ORDER — TRAZODONE HYDROCHLORIDE 50 MG/1
50 TABLET ORAL NIGHTLY
Qty: 90 TABLET | Refills: 1 | Status: SHIPPED | OUTPATIENT
Start: 2025-02-07 | End: 2025-08-06

## 2025-02-07 NOTE — TELEPHONE ENCOUNTER
Spoke on phone with patient, due to potential hydroxyzine (for anxiety PRN) contributing to dry mouth which may be contributing to thrush as she has noticed a lot of whiteness on her tongue in the last few days spoke about starting Nystatin. She will also be making a close follow up with PCP. Went over this writers leave which patient understands there is a surrogate in the meantime and can contact clinic anytime. Will send in nystatin Rx for patient's thrush. As per any mood dysregulation rescue medications patient requested for prior medications that she had been prescribed in the past, will be rx'ing patient rescue medications to assist with sleep of seroquel xr and trazodone which will be reflected in chart. Spoke about replacing hydroxyzine that may be contributing to thrush with trazodone which patient is agreeable to. Denies prior adverse effects on medications. Patient stated she would not like to use  medication from the past if she is in need of any rescue prns and is appreciative. Confirmed patient's next appointment has been scheduled.

## 2025-02-10 DIAGNOSIS — F41.1 GAD (GENERALIZED ANXIETY DISORDER): ICD-10-CM

## 2025-02-24 DIAGNOSIS — F41.1 GENERALIZED ANXIETY DISORDER: ICD-10-CM

## 2025-02-24 RX ORDER — GABAPENTIN 100 MG/1
100 CAPSULE ORAL
Qty: 60 CAPSULE | Refills: 3 | Status: SHIPPED | OUTPATIENT
Start: 2025-02-24 | End: 2025-02-28 | Stop reason: DRUGHIGH

## 2025-02-24 NOTE — TELEPHONE ENCOUNTER
Pt requested refill of gabapentin. Records indicate a dose of 100mg at bedtime for anxiety relief. Will provide a prescription of 100mg gabapentin to be taken twice daily on an as needed basis.

## 2025-02-26 LAB
BASOPHILS # BLD AUTO: 0.5 % (ref 0–1.8)
BASOPHILS # BLD: 0.05 K/UL (ref 0–0.12)
EKG IMPRESSION: NORMAL
EOSINOPHIL # BLD AUTO: 0.18 K/UL (ref 0–0.51)
EOSINOPHIL NFR BLD: 1.8 % (ref 0–6.9)
ERYTHROCYTE [DISTWIDTH] IN BLOOD BY AUTOMATED COUNT: 41.3 FL (ref 35.9–50)
HCT VFR BLD AUTO: 36.7 % (ref 37–47)
HGB BLD-MCNC: 13 G/DL (ref 12–16)
IMM GRANULOCYTES # BLD AUTO: 0.1 K/UL (ref 0–0.11)
IMM GRANULOCYTES NFR BLD AUTO: 1 % (ref 0–0.9)
LYMPHOCYTES # BLD AUTO: 3.9 K/UL (ref 1–4.8)
LYMPHOCYTES NFR BLD: 39.8 % (ref 22–41)
MCH RBC QN AUTO: 32.3 PG (ref 27–33)
MCHC RBC AUTO-ENTMCNC: 35.4 G/DL (ref 32.2–35.5)
MCV RBC AUTO: 91.3 FL (ref 81.4–97.8)
MONOCYTES # BLD AUTO: 0.85 K/UL (ref 0–0.85)
MONOCYTES NFR BLD AUTO: 8.7 % (ref 0–13.4)
NEUTROPHILS # BLD AUTO: 4.73 K/UL (ref 1.82–7.42)
NEUTROPHILS NFR BLD: 48.2 % (ref 44–72)
NRBC # BLD AUTO: 0 K/UL
NRBC BLD-RTO: 0 /100 WBC (ref 0–0.2)
PLATELET # BLD AUTO: 303 K/UL (ref 164–446)
PMV BLD AUTO: 9.1 FL (ref 9–12.9)
RBC # BLD AUTO: 4.02 M/UL (ref 4.2–5.4)
WBC # BLD AUTO: 9.8 K/UL (ref 4.8–10.8)

## 2025-02-26 PROCEDURE — 84703 CHORIONIC GONADOTROPIN ASSAY: CPT

## 2025-02-26 PROCEDURE — 93005 ELECTROCARDIOGRAM TRACING: CPT | Mod: TC

## 2025-02-26 PROCEDURE — 36415 COLL VENOUS BLD VENIPUNCTURE: CPT

## 2025-02-26 PROCEDURE — 80053 COMPREHEN METABOLIC PANEL: CPT

## 2025-02-26 PROCEDURE — 83880 ASSAY OF NATRIURETIC PEPTIDE: CPT

## 2025-02-26 PROCEDURE — 85025 COMPLETE CBC W/AUTO DIFF WBC: CPT

## 2025-02-26 PROCEDURE — 99284 EMERGENCY DEPT VISIT MOD MDM: CPT

## 2025-02-26 PROCEDURE — 93005 ELECTROCARDIOGRAM TRACING: CPT | Mod: TC | Performed by: STUDENT IN AN ORGANIZED HEALTH CARE EDUCATION/TRAINING PROGRAM

## 2025-02-26 PROCEDURE — 84484 ASSAY OF TROPONIN QUANT: CPT

## 2025-02-26 ASSESSMENT — FIBROSIS 4 INDEX: FIB4 SCORE: 0.68

## 2025-02-27 ENCOUNTER — HOSPITAL ENCOUNTER (EMERGENCY)
Facility: MEDICAL CENTER | Age: 40
End: 2025-02-27
Attending: STUDENT IN AN ORGANIZED HEALTH CARE EDUCATION/TRAINING PROGRAM
Payer: COMMERCIAL

## 2025-02-27 ENCOUNTER — APPOINTMENT (OUTPATIENT)
Dept: RADIOLOGY | Facility: MEDICAL CENTER | Age: 40
End: 2025-02-27
Attending: STUDENT IN AN ORGANIZED HEALTH CARE EDUCATION/TRAINING PROGRAM
Payer: COMMERCIAL

## 2025-02-27 ENCOUNTER — APPOINTMENT (OUTPATIENT)
Dept: RADIOLOGY | Facility: MEDICAL CENTER | Age: 40
End: 2025-02-27
Payer: COMMERCIAL

## 2025-02-27 VITALS
HEIGHT: 64 IN | OXYGEN SATURATION: 97 % | DIASTOLIC BLOOD PRESSURE: 77 MMHG | TEMPERATURE: 98.3 F | SYSTOLIC BLOOD PRESSURE: 150 MMHG | WEIGHT: 121.03 LBS | HEART RATE: 81 BPM | RESPIRATION RATE: 16 BRPM | BODY MASS INDEX: 20.66 KG/M2

## 2025-02-27 DIAGNOSIS — R07.9 CHEST PAIN, UNSPECIFIED TYPE: ICD-10-CM

## 2025-02-27 LAB
ALBUMIN SERPL BCP-MCNC: 4 G/DL (ref 3.2–4.9)
ALBUMIN/GLOB SERPL: 1.7 G/DL
ALP SERPL-CCNC: 66 U/L (ref 30–99)
ALT SERPL-CCNC: 26 U/L (ref 2–50)
ANION GAP SERPL CALC-SCNC: 10 MMOL/L (ref 7–16)
AST SERPL-CCNC: 29 U/L (ref 12–45)
BILIRUB SERPL-MCNC: <0.2 MG/DL (ref 0.1–1.5)
BUN SERPL-MCNC: 20 MG/DL (ref 8–22)
CALCIUM ALBUM COR SERPL-MCNC: 9.4 MG/DL (ref 8.5–10.5)
CALCIUM SERPL-MCNC: 9.4 MG/DL (ref 8.5–10.5)
CHLORIDE SERPL-SCNC: 107 MMOL/L (ref 96–112)
CO2 SERPL-SCNC: 20 MMOL/L (ref 20–33)
CREAT SERPL-MCNC: 0.82 MG/DL (ref 0.5–1.4)
GFR SERPLBLD CREATININE-BSD FMLA CKD-EPI: 94 ML/MIN/1.73 M 2
GLOBULIN SER CALC-MCNC: 2.4 G/DL (ref 1.9–3.5)
GLUCOSE SERPL-MCNC: 91 MG/DL (ref 65–99)
HCG SERPL QL: NEGATIVE
NT-PROBNP SERPL IA-MCNC: <36 PG/ML (ref 0–125)
POTASSIUM SERPL-SCNC: 4.4 MMOL/L (ref 3.6–5.5)
PROT SERPL-MCNC: 6.4 G/DL (ref 6–8.2)
SODIUM SERPL-SCNC: 137 MMOL/L (ref 135–145)
TROPONIN T SERPL-MCNC: <6 NG/L (ref 6–19)

## 2025-02-27 PROCEDURE — A9270 NON-COVERED ITEM OR SERVICE: HCPCS | Performed by: STUDENT IN AN ORGANIZED HEALTH CARE EDUCATION/TRAINING PROGRAM

## 2025-02-27 PROCEDURE — 71045 X-RAY EXAM CHEST 1 VIEW: CPT

## 2025-02-27 PROCEDURE — 700102 HCHG RX REV CODE 250 W/ 637 OVERRIDE(OP): Performed by: STUDENT IN AN ORGANIZED HEALTH CARE EDUCATION/TRAINING PROGRAM

## 2025-02-27 RX ORDER — LORAZEPAM 1 MG/1
1 TABLET ORAL ONCE
Status: COMPLETED | OUTPATIENT
Start: 2025-02-27 | End: 2025-02-27

## 2025-02-27 RX ADMIN — LORAZEPAM 1 MG: 1 TABLET ORAL at 02:06

## 2025-02-27 NOTE — ED TRIAGE NOTES
"Chief Complaint   Patient presents with    Chest Pain     Started 4 weeks ago    Shortness of Breath    Nausea      Ambulatory to triage; states started 4 weeks ago and gotten worse today. Also states she had silicon breast implants and never had mammogram after the procedure;states she can't feel any lumps; and no pain.    States she feels like she can't catch her breath sometimes.      BP (!) 142/84   Pulse 74   Temp 36.8 °C (98.3 °F) (Temporal)   Resp 16   Ht 1.626 m (5' 4\")   Wt 54.9 kg (121 lb 0.5 oz)   SpO2 98%   BMI 20.78 kg/m²     "

## 2025-02-27 NOTE — ED PROVIDER NOTES
ED Provider Note    CHIEF COMPLAINT  Chief Complaint   Patient presents with    Chest Pain     Started 4 weeks ago    Shortness of Breath    Nausea       EXTERNAL RECORDS REVIEWED  Outpatient notes has a history of ADHD.  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 person who presents with chest pain and shortness of breath.  She says that that this evening she was going to bed she had an intense pain in her chest.  She says it almost felt like her 'heart was exploding' and 'sending blood up into my head'.  She says it is almost completely resolved at this time.  She says that she has a history of anxiety and chest pain associated with anxiety and has a hard time telling the difference.  She says that she was using her anxiety blanket going to sleep tonight.  She says that she has had pain for about 4 weeks now but this evening the pain was much more intense.  She did have associated shortness of breath as well.  She felt nauseated but had no vomiting or diaphoresis.  She has an IUD and is not on oral contraceptives.  No recent immobilization such as long plane or car ride, hospitalization or surgery.  No unilateral leg swelling.  No history of PE or DVT.  No hemoptysis.  Patient does take medications for anxiety.  She says that she does have breakthrough panic attacks.  She denies any prior cardiac history.    PAST MEDICAL HISTORY   has a past medical history of ADHD, Anxiety, and PTSD (post-traumatic stress disorder).    SURGICAL HISTORY   has a past surgical history that includes breast reconstruction (Bilateral, 02/14/2019).    FAMILY HISTORY  Family History   Problem Relation Age of Onset    No Known Problems Mother     No Known Problems Father     Multiple Sclerosis Maternal Grandmother     Cancer Paternal Grandmother 92        Breast    Diabetes Paternal Grandfather        SOCIAL  "HISTORY  Social History     Tobacco Use    Smoking status: Every Day     Current packs/day: 0.50     Types: Cigarettes    Smokeless tobacco: Never   Vaping Use    Vaping status: Every Day    Substances: Nicotine    Devices: Disposable   Substance and Sexual Activity    Alcohol use: Not Currently    Drug use: Not Currently     Types: Inhaled     Comment: Marijuana occasional    Sexual activity: Yes     Partners: Male     Birth control/protection: I.U.D.       CURRENT MEDICATIONS  Home Medications       Reviewed by Ashleigh Patrick R.N. (Registered Nurse) on 02/26/25 at 2333  Med List Status: Partial     Medication Last Dose Status   albuterol 108 (90 Base) MCG/ACT Aero Soln inhalation aerosol  Active   gabapentin (NEURONTIN) 100 MG Cap  Active   hydrOXYzine HCl (ATARAX) 50 MG Tab  Active   Levonorgestrel (LEWIS) 13.5 MG IUD  Active   Methylphenidate HCl ER, PM, (JORNAY PM) 60 MG CAPSULE SR 24 HR  Active   Methylphenidate HCl ER, PM, 60 MG CAPSULE SR 24 HR  Active   ondansetron (ZOFRAN ODT) 4 MG TABLET DISPERSIBLE  Active   QUEtiapine Fumarate 50 MG TABLET SR 24 HR  Active   traZODone (DESYREL) 50 MG Tab  Active   tretinoin (RETIN-A) 0.1 % cream  Active                    ALLERGIES  No Known Allergies    PHYSICAL EXAM  VITAL SIGNS: BP (!) 150/77   Pulse 81   Temp 36.8 °C (98.3 °F) (Temporal)   Resp 16   Ht 1.626 m (5' 4\")   Wt 54.9 kg (121 lb 0.5 oz)   SpO2 97%   BMI 20.78 kg/m²    Constitutional: Awake and alert Non toxic  HENT: Normal inspection  Moist mucous membranes  Eyes: Normal inspection  Neck: Grossly normal range of motion.  Cardiovascular: Normal heart rate, Normal rhythm.  Symmetric peripheral pulses.   Thorax & Lungs: No respiratory distress, No wheezing, No rales, No rhonchi, No chest tenderness.   Abdomen: Soft, non-distended, nontender to palpation in all 4 quadrants, no mass  Skin: No obvious rash.  Extremities: Warm, well perfused. No clubbing, cyanosis, edema   Neurologic: Grossly normal "   Psychiatric: Normal for situation      EKG/LABS  Results for orders placed or performed during the hospital encounter of 25   EKG    Collection Time: 25 11:20 PM   Result Value Ref Range    Report       [facility] Emergency Dept.    Test Date:  2025  Pt Name:    MORGAN GALLOWAY              Department: ER  MRN:        [id number]                      Room:  Gender:     Female                       Technician: [id number]  :        1985                   Requested By:ER TRIAGE PROTOCOL  Order #:    [id number]                    Reading MD:    Measurements  Intervals                                Axis  Rate:       72                           P:          92  VT:         148                          QRS:        94  QRSD:       97                           T:          54  QT:         392  QTc:        430    Interpretive Statements  Sinus rhythm  Borderline right axis deviation  No previous ECG available for comparison     CBC with Differential    Collection Time: 25 11:41 PM   Result Value Ref Range    WBC 9.8 4.8 - 10.8 K/uL    RBC 4.02 (L) 4.20 - 5.40 M/uL    Hemoglobin 13.0 12.0 - 16.0 g/dL    Hematocrit 36.7 (L) 37.0 - 47.0 %    MCV 91.3 81.4 - 97.8 fL    MCH 32.3 27.0 - 33.0 pg    MCHC 35.4 32.2 - 35.5 g/dL    RDW 41.3 35.9 - 50.0 fL    Platelet Count 303 164 - 446 K/uL    MPV 9.1 9.0 - 12.9 fL    Neutrophils-Polys 48.20 44.00 - 72.00 %    Lymphocytes 39.80 22.00 - 41.00 %    Monocytes 8.70 0.00 - 13.40 %    Eosinophils 1.80 0.00 - 6.90 %    Basophils 0.50 0.00 - 1.80 %    Immature Granulocytes 1.00 (H) 0.00 - 0.90 %    Nucleated RBC 0.00 0.00 - 0.20 /100 WBC    Neutrophils (Absolute) 4.73 1.82 - 7.42 K/uL    Lymphs (Absolute) 3.90 1.00 - 4.80 K/uL    Monos (Absolute) 0.85 0.00 - 0.85 K/uL    Eos (Absolute) 0.18 0.00 - 0.51 K/uL    Baso (Absolute) 0.05 0.00 - 0.12 K/uL    Immature Granulocytes (abs) 0.10 0.00 - 0.11 K/uL    NRBC (Absolute) 0.00 K/uL   Complete Metabolic  Panel (CMP)    Collection Time: 02/26/25 11:41 PM   Result Value Ref Range    Sodium 137 135 - 145 mmol/L    Potassium 4.4 3.6 - 5.5 mmol/L    Chloride 107 96 - 112 mmol/L    Co2 20 20 - 33 mmol/L    Anion Gap 10.0 7.0 - 16.0    Glucose 91 65 - 99 mg/dL    Bun 20 8 - 22 mg/dL    Creatinine 0.82 0.50 - 1.40 mg/dL    Calcium 9.4 8.5 - 10.5 mg/dL    Correct Calcium 9.4 8.5 - 10.5 mg/dL    AST(SGOT) 29 12 - 45 U/L    ALT(SGPT) 26 2 - 50 U/L    Alkaline Phosphatase 66 30 - 99 U/L    Total Bilirubin <0.2 0.1 - 1.5 mg/dL    Albumin 4.0 3.2 - 4.9 g/dL    Total Protein 6.4 6.0 - 8.2 g/dL    Globulin 2.4 1.9 - 3.5 g/dL    A-G Ratio 1.7 g/dL   proBrain Natriuretic Peptide, NT (BNP_    Collection Time: 02/26/25 11:41 PM   Result Value Ref Range    NT-proBNP <36 0 - 125 pg/mL   Troponins NOW    Collection Time: 02/26/25 11:41 PM   Result Value Ref Range    Troponin T <6 6 - 19 ng/L   HCG Qual Serum    Collection Time: 02/26/25 11:41 PM   Result Value Ref Range    Beta-Hcg Qualitative Serum Negative Negative   ESTIMATED GFR    Collection Time: 02/26/25 11:41 PM   Result Value Ref Range    GFR (CKD-EPI) 94 >60 mL/min/1.73 m 2       I have independently interpreted this EKG    RADIOLOGY/PROCEDURES   I have independently interpreted the diagnostic imaging associated with this visit and am waiting the final reading from the radiologist.   My preliminary interpretation is as follows: Normal appearing mediastinum    Radiologist interpretation:  DX-CHEST-PORTABLE (1 VIEW)   Final Result      No acute cardiac or pulmonary abnormalities are identified.          COURSE & MEDICAL DECISION MAKING    ASSESSMENT, COURSE AND PLAN  Care Narrative: This is a 39-year-old with a history of anxiety, ADHD who presents with brief, resolved episode of chest pain.  She arrives mildly hypertensive but otherwise has normal vital signs, clinically stable.  EKG with borderline right axis deviation, no signs of arrhythmia or ischemia.  I considered the  possibility of PE but she is low risk in accordance with the PERC criteria and no indication for further workup of this.  EKG nonischemic, troponin is normal and I doubt ACS.  She is low risk with a heart score of 0.  Troponin has been obtained in excess of 3 hours from the onset of her symptoms and there is no indication for serial troponins.  Chest x-ray obtained shows no widened mediastinum.  She is also not significantly hypertensive, she has no neurologic deficits or risk factors for an aortic dissection.  She has no recent illness, normal troponin and normal EKG and doubt pericarditis or myocarditis.    Patient's pain has almost completely resolved.  She was given a dose of Ativan for anxiety which helps her significantly.  She does have a history of anxiety and it certainly could be related to this.  I however did stress the importance that she follow-up closely with her primary doctor for further management.  He understands to return to the ER if she has worsening pain, difficulty breathing, vomiting, lightheadedness or other concern.            DISPOSITION AND DISCUSSIONS  I have discussed management of the patient with the following physicians and DYAN's:  none    Discussion of management with other QHP or appropriate source(none    Escalation of care considered, and ultimately not performed:acute inpatient care management, however at this time, the patient is most appropriate for outpatient management    Barriers to care at this time, including but not limited to:  none .     Decision tools and prescription drugs considered including, but not limited to: PERC rule negative .    FINAL DIAGNOSIS  1. Chest pain, unspecified type Acute        Electronically signed by: Madonna Paz M.D., 2/27/2025 12:26 AM

## 2025-02-27 NOTE — ED NOTES
Discharge instructions given.  All questions answered.   Pt to follow-up with PCP, return to ER if symptoms worsen as discussed.  Pt verbalized understanding.  All belongings with pt.  Pt ambulated with even and steady gait to lobby.

## 2025-02-27 NOTE — ED NOTES
Pt to [facility]  from waiting room. Pt oriented to room, hooked up to monitor, call light w/in reach. No further needs at this time.

## 2025-02-28 ENCOUNTER — HOSPITAL ENCOUNTER (OUTPATIENT)
Dept: BEHAVIORAL HEALTH | Facility: MEDICAL CENTER | Age: 40
End: 2025-02-28
Attending: PSYCHIATRY & NEUROLOGY
Payer: COMMERCIAL

## 2025-02-28 DIAGNOSIS — F32.1 MODERATE MAJOR DEPRESSION (HCC): ICD-10-CM

## 2025-02-28 DIAGNOSIS — F90.0 ADHD (ATTENTION DEFICIT HYPERACTIVITY DISORDER), INATTENTIVE TYPE: ICD-10-CM

## 2025-02-28 DIAGNOSIS — F41.1 GENERALIZED ANXIETY DISORDER: ICD-10-CM

## 2025-02-28 DIAGNOSIS — F41.1 GAD (GENERALIZED ANXIETY DISORDER): ICD-10-CM

## 2025-02-28 PROCEDURE — 90791 PSYCH DIAGNOSTIC EVALUATION: CPT | Performed by: MARRIAGE & FAMILY THERAPIST

## 2025-02-28 RX ORDER — GABAPENTIN 600 MG/1
TABLET ORAL
Qty: 60 TABLET | Refills: 0 | Status: SHIPPED | OUTPATIENT
Start: 2025-02-28

## 2025-02-28 RX ORDER — QUETIAPINE FUMARATE 50 MG/1
TABLET, FILM COATED ORAL
Qty: 60 TABLET | Refills: 0 | Status: SHIPPED | OUTPATIENT
Start: 2025-02-28

## 2025-02-28 ASSESSMENT — PATIENT HEALTH QUESTIONNAIRE - PHQ9
CLINICAL INTERPRETATION OF PHQ2 SCORE: 1
5. POOR APPETITE OR OVEREATING: 1 - SEVERAL DAYS
SUM OF ALL RESPONSES TO PHQ QUESTIONS 1-9: 13

## 2025-02-28 ASSESSMENT — ANXIETY QUESTIONNAIRES
5. BEING SO RESTLESS THAT IT IS HARD TO SIT STILL: NEARLY EVERY DAY
4. TROUBLE RELAXING: NEARLY EVERY DAY
1. FEELING NERVOUS, ANXIOUS, OR ON EDGE: NEARLY EVERY DAY
2. NOT BEING ABLE TO STOP OR CONTROL WORRYING: SEVERAL DAYS
6. BECOMING EASILY ANNOYED OR IRRITABLE: MORE THAN HALF THE DAYS
GAD7 TOTAL SCORE: 16
7. FEELING AFRAID AS IF SOMETHING AWFUL MIGHT HAPPEN: SEVERAL DAYS
3. WORRYING TOO MUCH ABOUT DIFFERENT THINGS: NEARLY EVERY DAY
IF YOU CHECKED OFF ANY PROBLEMS ON THIS QUESTIONNAIRE, HOW DIFFICULT HAVE THESE PROBLEMS MADE IT FOR YOU TO DO YOUR WORK, TAKE CARE OF THINGS AT HOME, OR GET ALONG WITH OTHER PEOPLE: VERY DIFFICULT
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
## 2025-02-28 NOTE — PROGRESS NOTES
Reported that anxiety has been heightened to a much greater degree due to life stressors, prompting ER visit yesterday.  She stated stated that Nystatin helped clear symptoms of thrush, however with worsened anxiety she started taking more Hydroxyzine and has noticed that thrush has returned.  Discussed discontinuing Hydroxyzine and utilizing alternative medications.  Discussed prescribing Gabapentin 600 mg tabs, as 1/2 tablets (300 mg) to take as needed for anxiety, in addition to Seroquel 50 mg, take 1/2 tab to 1 tab to take up to twice daily as needed for anxiety and sleep as these have been helpful in the past.  Discussed scheduling her for close follow up on Monday with covering resident physician.  Lastly, will reduce dosing of Jornay back down to 40 mg due higher dose of 60 mg potentially contributing to increased anxiety.  Tonya is scheduled for intake appointment with IOP today at 11:30 AM and encouraged her to keep appointment as she is interested in learning alternative coping skills to help with anxiety management.

## 2025-03-06 NOTE — Clinical Note
REFERRAL APPROVAL NOTICE         Sent on March 6, 2025                   Tonya Hillman  [address] [place] [address]                   Dear MsSheng Rafy,    After a careful review of the medical information and benefit coverage, [facility] has processed your referral. See below for additional details.    If applicable, you must be actively enrolled with your insurance for coverage of the authorized service. If you have any questions regarding your coverage, please contact your insurance directly.    REFERRAL INFORMATION   Referral #:  [id number]  Referred-To Department    Referred-By Provider:  Behavioral Health    CHETAN Muñoz   [facility]      [address]  [place] [id number] [phone] [address], [address]  [place] [id number] [phone]    Referral Start Date:  02/28/2025  Referral End Date:   02/28/2026           SCHEDULING  If you do not already have an appointment, please call [phone] to make an appointment.   MORE INFORMATION  As a reminder, [facility] ownership has changed, meaning this location is now owned and operated by [facility]. As such, we want to clarify that our patients should expect to receive two separate bills for the services received at [facility] - one representing the [facility] facility fees as the owner of the establishment, and the other to represent the physician's services and subsequent fees. You can speak with your insurance carrier for a pricing estimate by calling the customer service number on the back of your card and ask about charges for a hospital outpatient visit.  If you do not already have a [facility] [place] account, sign up at: "[email or web address]  You can access your medical information, make appointments, see lab results, billing information, and more.  If you have questions regarding this referral, please contact  the [facility] department  at:             [phone]. Monday - Friday 7:30AM - 5:00PM.      Sincerely,  [facility]

## 2025-03-07 ENCOUNTER — OFFICE VISIT (OUTPATIENT)
Dept: BEHAVIORAL HEALTH | Facility: PSYCHIATRIC FACILITY | Age: 40
End: 2025-03-07
Payer: COMMERCIAL

## 2025-03-07 VITALS
SYSTOLIC BLOOD PRESSURE: 116 MMHG | WEIGHT: 121 LBS | HEIGHT: 64 IN | DIASTOLIC BLOOD PRESSURE: 74 MMHG | BODY MASS INDEX: 20.66 KG/M2 | HEART RATE: 69 BPM | OXYGEN SATURATION: 97 %

## 2025-03-07 DIAGNOSIS — F19.94 SUBSTANCE INDUCED MOOD DISORDER (HCC): ICD-10-CM

## 2025-03-07 DIAGNOSIS — F39 UNSPECIFIED MOOD (AFFECTIVE) DISORDER (HCC): ICD-10-CM

## 2025-03-07 DIAGNOSIS — F90.0 ADHD (ATTENTION DEFICIT HYPERACTIVITY DISORDER), INATTENTIVE TYPE: ICD-10-CM

## 2025-03-07 PROCEDURE — 99999 PR NO CHARGE: CPT

## 2025-03-07 ASSESSMENT — FIBROSIS 4 INDEX: FIB4 SCORE: 0.69

## 2025-03-07 ASSESSMENT — ENCOUNTER SYMPTOMS: BLURRED VISION: 1
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
## 2025-03-20 ENCOUNTER — APPOINTMENT (OUTPATIENT)
Dept: BEHAVIORAL HEALTH | Facility: MEDICAL CENTER | Age: 40
End: 2025-03-20
Attending: PSYCHIATRY & NEUROLOGY
Payer: COMMERCIAL

## 2025-03-25 ENCOUNTER — APPOINTMENT (OUTPATIENT)
Dept: BEHAVIORAL HEALTH | Facility: MEDICAL CENTER | Age: 40
End: 2025-03-25
Attending: PSYCHIATRY & NEUROLOGY
Payer: COMMERCIAL

## 2025-03-26 ENCOUNTER — APPOINTMENT (OUTPATIENT)
Dept: MEDICAL GROUP | Facility: IMAGING CENTER | Age: 40
End: 2025-03-26
Payer: COMMERCIAL

## 2025-03-26 ENCOUNTER — APPOINTMENT (OUTPATIENT)
Dept: BEHAVIORAL HEALTH | Facility: MEDICAL CENTER | Age: 40
End: 2025-03-26
Attending: PSYCHIATRY & NEUROLOGY
Payer: COMMERCIAL

## 2025-03-27 ENCOUNTER — APPOINTMENT (OUTPATIENT)
Dept: BEHAVIORAL HEALTH | Facility: MEDICAL CENTER | Age: 40
End: 2025-03-27
Attending: PSYCHIATRY & NEUROLOGY
Payer: COMMERCIAL

## 2025-04-01 ENCOUNTER — APPOINTMENT (OUTPATIENT)
Dept: BEHAVIORAL HEALTH | Facility: MEDICAL CENTER | Age: 40
End: 2025-04-01
Attending: PSYCHIATRY & NEUROLOGY
Payer: COMMERCIAL

## 2025-04-02 ENCOUNTER — APPOINTMENT (OUTPATIENT)
Dept: BEHAVIORAL HEALTH | Facility: MEDICAL CENTER | Age: 40
End: 2025-04-02
Attending: PSYCHIATRY & NEUROLOGY
Payer: COMMERCIAL

## 2025-04-03 ENCOUNTER — APPOINTMENT (OUTPATIENT)
Dept: BEHAVIORAL HEALTH | Facility: MEDICAL CENTER | Age: 40
End: 2025-04-03
Attending: PSYCHIATRY & NEUROLOGY
Payer: COMMERCIAL

## 2025-04-08 ENCOUNTER — APPOINTMENT (OUTPATIENT)
Dept: BEHAVIORAL HEALTH | Facility: MEDICAL CENTER | Age: 40
End: 2025-04-08
Attending: PSYCHIATRY & NEUROLOGY
Payer: COMMERCIAL

## 2025-04-09 ENCOUNTER — APPOINTMENT (OUTPATIENT)
Dept: BEHAVIORAL HEALTH | Facility: MEDICAL CENTER | Age: 40
End: 2025-04-09
Attending: PSYCHIATRY & NEUROLOGY
Payer: COMMERCIAL

## 2025-04-10 ENCOUNTER — APPOINTMENT (OUTPATIENT)
Dept: BEHAVIORAL HEALTH | Facility: MEDICAL CENTER | Age: 40
End: 2025-04-10
Attending: PSYCHIATRY & NEUROLOGY
Payer: COMMERCIAL

## 2025-04-11 ENCOUNTER — APPOINTMENT (OUTPATIENT)
Dept: BEHAVIORAL HEALTH | Facility: MEDICAL CENTER | Age: 40
End: 2025-04-11
Attending: PSYCHIATRY & NEUROLOGY
Payer: COMMERCIAL

## 2025-04-15 ENCOUNTER — APPOINTMENT (OUTPATIENT)
Dept: BEHAVIORAL HEALTH | Facility: MEDICAL CENTER | Age: 40
End: 2025-04-15
Attending: PSYCHIATRY & NEUROLOGY
Payer: COMMERCIAL

## 2025-04-16 ENCOUNTER — APPOINTMENT (OUTPATIENT)
Dept: BEHAVIORAL HEALTH | Facility: MEDICAL CENTER | Age: 40
End: 2025-04-16
Attending: PSYCHIATRY & NEUROLOGY
Payer: COMMERCIAL

## 2025-04-17 ENCOUNTER — APPOINTMENT (OUTPATIENT)
Dept: BEHAVIORAL HEALTH | Facility: MEDICAL CENTER | Age: 40
End: 2025-04-17
Attending: PSYCHIATRY & NEUROLOGY
Payer: COMMERCIAL

## 2025-04-22 ENCOUNTER — APPOINTMENT (OUTPATIENT)
Dept: BEHAVIORAL HEALTH | Facility: MEDICAL CENTER | Age: 40
End: 2025-04-22
Attending: PSYCHIATRY & NEUROLOGY
Payer: COMMERCIAL

## 2025-04-23 ENCOUNTER — APPOINTMENT (OUTPATIENT)
Dept: BEHAVIORAL HEALTH | Facility: MEDICAL CENTER | Age: 40
End: 2025-04-23
Attending: PSYCHIATRY & NEUROLOGY
Payer: COMMERCIAL

## 2025-04-24 ENCOUNTER — APPOINTMENT (OUTPATIENT)
Dept: BEHAVIORAL HEALTH | Facility: MEDICAL CENTER | Age: 40
End: 2025-04-24
Attending: PSYCHIATRY & NEUROLOGY
Payer: COMMERCIAL

## 2025-04-29 ENCOUNTER — APPOINTMENT (OUTPATIENT)
Dept: BEHAVIORAL HEALTH | Facility: MEDICAL CENTER | Age: 40
End: 2025-04-29
Attending: PSYCHIATRY & NEUROLOGY
Payer: COMMERCIAL

## 2025-04-30 ENCOUNTER — APPOINTMENT (OUTPATIENT)
Dept: BEHAVIORAL HEALTH | Facility: MEDICAL CENTER | Age: 40
End: 2025-04-30
Attending: PSYCHIATRY & NEUROLOGY
Payer: COMMERCIAL

## 2025-05-01 ENCOUNTER — APPOINTMENT (OUTPATIENT)
Dept: BEHAVIORAL HEALTH | Facility: MEDICAL CENTER | Age: 40
End: 2025-05-01
Attending: PSYCHIATRY & NEUROLOGY
Payer: COMMERCIAL

## 2025-05-02 ENCOUNTER — APPOINTMENT (OUTPATIENT)
Dept: BEHAVIORAL HEALTH | Facility: PSYCHIATRIC FACILITY | Age: 40
End: 2025-05-02
Payer: COMMERCIAL

## 2025-05-06 ENCOUNTER — APPOINTMENT (OUTPATIENT)
Dept: BEHAVIORAL HEALTH | Facility: MEDICAL CENTER | Age: 40
End: 2025-05-06
Attending: PSYCHIATRY & NEUROLOGY
Payer: COMMERCIAL

## 2025-05-07 ENCOUNTER — APPOINTMENT (OUTPATIENT)
Dept: BEHAVIORAL HEALTH | Facility: MEDICAL CENTER | Age: 40
End: 2025-05-07
Attending: PSYCHIATRY & NEUROLOGY
Payer: COMMERCIAL

## 2025-05-08 ENCOUNTER — APPOINTMENT (OUTPATIENT)
Dept: BEHAVIORAL HEALTH | Facility: MEDICAL CENTER | Age: 40
End: 2025-05-08
Attending: PSYCHIATRY & NEUROLOGY
Payer: COMMERCIAL

## 2025-05-13 ENCOUNTER — APPOINTMENT (OUTPATIENT)
Dept: BEHAVIORAL HEALTH | Facility: MEDICAL CENTER | Age: 40
End: 2025-05-13
Attending: PSYCHIATRY & NEUROLOGY
Payer: COMMERCIAL

## 2025-05-14 ENCOUNTER — APPOINTMENT (OUTPATIENT)
Dept: BEHAVIORAL HEALTH | Facility: MEDICAL CENTER | Age: 40
End: 2025-05-14
Attending: PSYCHIATRY & NEUROLOGY
Payer: COMMERCIAL

## 2025-05-15 ENCOUNTER — APPOINTMENT (OUTPATIENT)
Dept: BEHAVIORAL HEALTH | Facility: MEDICAL CENTER | Age: 40
End: 2025-05-15
Attending: PSYCHIATRY & NEUROLOGY
Payer: COMMERCIAL

## 2025-05-20 ENCOUNTER — APPOINTMENT (OUTPATIENT)
Dept: BEHAVIORAL HEALTH | Facility: MEDICAL CENTER | Age: 40
End: 2025-05-20
Attending: PSYCHIATRY & NEUROLOGY
Payer: COMMERCIAL

## 2025-05-21 ENCOUNTER — APPOINTMENT (OUTPATIENT)
Dept: BEHAVIORAL HEALTH | Facility: MEDICAL CENTER | Age: 40
End: 2025-05-21
Attending: PSYCHIATRY & NEUROLOGY
Payer: COMMERCIAL

## 2025-05-22 ENCOUNTER — APPOINTMENT (OUTPATIENT)
Dept: BEHAVIORAL HEALTH | Facility: MEDICAL CENTER | Age: 40
End: 2025-05-22
Attending: PSYCHIATRY & NEUROLOGY
Payer: COMMERCIAL

## 2025-05-27 ENCOUNTER — APPOINTMENT (OUTPATIENT)
Dept: BEHAVIORAL HEALTH | Facility: MEDICAL CENTER | Age: 40
End: 2025-05-27
Attending: PSYCHIATRY & NEUROLOGY
Payer: COMMERCIAL

## 2025-05-28 ENCOUNTER — APPOINTMENT (OUTPATIENT)
Dept: BEHAVIORAL HEALTH | Facility: MEDICAL CENTER | Age: 40
End: 2025-05-28
Attending: PSYCHIATRY & NEUROLOGY
Payer: COMMERCIAL

## 2025-05-29 ENCOUNTER — APPOINTMENT (OUTPATIENT)
Dept: BEHAVIORAL HEALTH | Facility: MEDICAL CENTER | Age: 40
End: 2025-05-29
Attending: PSYCHIATRY & NEUROLOGY
Payer: COMMERCIAL

## 2025-06-03 ENCOUNTER — APPOINTMENT (OUTPATIENT)
Dept: BEHAVIORAL HEALTH | Facility: MEDICAL CENTER | Age: 40
End: 2025-06-03
Attending: PSYCHIATRY & NEUROLOGY
Payer: COMMERCIAL

## 2025-06-04 ENCOUNTER — APPOINTMENT (OUTPATIENT)
Dept: BEHAVIORAL HEALTH | Facility: MEDICAL CENTER | Age: 40
End: 2025-06-04
Attending: PSYCHIATRY & NEUROLOGY
Payer: COMMERCIAL

## 2025-06-05 ENCOUNTER — APPOINTMENT (OUTPATIENT)
Dept: BEHAVIORAL HEALTH | Facility: MEDICAL CENTER | Age: 40
End: 2025-06-05
Attending: PSYCHIATRY & NEUROLOGY
Payer: COMMERCIAL

## 2025-07-24 NOTE — PROGRESS NOTES
"[facility] Outpatient Psychiatric Follow Up Note  Evaluation completed by: Surinder Adorno M.D.   Date of Service: 07/25/2025  Appointment type: in-office appointment.  Attending:  Yadi Matthew M.D.   Information below was collected from: patient    CHIEF COMPLIANT:  No chief complaint on file.    HPI:   Tonya Hillman is a 39 y.o. old person who presents today for regularly scheduled follow up for assessment of No chief complaint on file.    Patient was last seen on 3/7/25. At that time, the plan was   -contact IOP program at [facility] when ready to start   -continue gabapentin to 300-600 mg PO TID PRN for anxiety and sleep  -continue quetiapine 25-50 mg PO BID PRN for sleep and anxiety  -continue jornay 40 mg PO at bedtime for inattention  -Continue trazodone 50 mg PO nightly for sleep    Interval Update:  - [facility] program: She says this was amazing, she was there for 30 day sober living program and then IOP.  She says she tried a bunch of different medications. She was told she was bipolar disorder (due to cannabis contributing to theresa) which she didn't accept. She was tested as not ADHD, but wonders if this was not valid as she was taking medication on the test. She was tapered off quetiapine. She was tapered off Journay (they don't prescribe stimulants) and didn't feel much difference. She had trial of lithium (3rd day was brought to ED, poor sleep, jaw clenching, couldn't think straight).   - currently taking lamotrigine which she felt made her depressed, and then went back down to 50 mg daily (feels that gabapentin makes it better). Was also trialed on lurasidone for hypomania (slept 4-7 hr per night but felt tired in the day) but it \"didn't work fast enough\" so was switched to other medication.  - notes when she falls asleep around 11:30pm or 12pm, and it feels like palpitations and panic since starting lamotrigine.  - moved back to [place] July 9th.  - current " "medications:   - lamotrigine 50 mg daily (palpitation at night, tired, depressed)   - gabapentin 300 mg PO BID for anxiety and sleep (notes blurry vision is resolved)   - hydroxyzine 10 mg prn (has in case of need, but it's been weeks)   - metformin 250 mg daily for prevention of metabolic side effect    History of generalized anxiety disorder   - Anxiety - OK    CUD/history of substance use induced psychosis  - cannabis: reports this causes jeanine, sober for 6 months.  - AVH: denies  - paranoia: denies    Bipolar disorder  - mood: denies issues with depression, more issue with jeanine.  - SI: denies  - sleep: 9pm-6am currently    Activities: work from home, exercise    PSYCHIATRIC REVIEW OF SYSTEMS:current symptoms as reported by pt.  Depression: See HPI  Jeanine: See HPI  Anxiety/Panic Attacks: See HPI  Trauma: Patient reports no signs or symptoms indicative of PTSD  Psychosis: Patient reports no signs or symptoms indicative of psychosis    REVIEW OF SYSTEMS   ROS    CURRENT MEDICATIONS  Current Medications[1]     PAST MEDICAL HISTORY  Past Medical History[2]  Allergies[3]  Past Surgical History[4]   Family History   Problem Relation Age of Onset    No Known Problems Mother     No Known Problems Father     Multiple Sclerosis Maternal Grandmother     Cancer Paternal Grandmother 92        Breast    Diabetes Paternal Grandfather      Social History[5]  Past Surgical History[6]    PSYCHIATRIC EXAMINATION   BP 99/66 (BP Location: Left arm, Patient Position: Sitting, BP Cuff Size: Adult)   Pulse (!) 54   Ht 1.626 m (5' 4\")   Wt 54.9 kg (121 lb)   SpO2 96%   BMI 20.77 kg/m²   Musculoskeletal: No abnormal movements noted  Appearance: well-developed and well-nourished, cooperative, engaged, and friendly  Thought Process:  linear, coherent, and goal-oriented  Abnormal or Psychotic Thoughts: No evidence of auditory or visual hallucinations, and no overt delusions noted  Speech: regular rate, rhythm, volume, tone, and " "syntax  Mood: \"ok\"  Affect: euthymic  SI/HI: Denies SI and HI  Orientation: alert and oriented  Recent and Remote Memory: no gross impairment in immediate, recent, or remote memory  Attention Span and Concentration: grossly intact  Insight/Judgement into symptoms: fair  Neurological Testing (MSSE Score and/or clock drawing): MMSE not performed during this encounter    SCREENINGS:      4/29/2024    11:20 AM 5/15/2024     9:00 AM 2/28/2025    11:30 AM   Depression Screen (PHQ-2/PHQ-9)   PHQ-2 Total Score 0 0 1   PHQ-9 Total Score   13         11/22/2023    10:11 AM 5/15/2024     9:14 AM 2/28/2025    11:45 AM   EVELYN 7   EVELYN-7 Total Score 10 6 16     CURRENT RISK ASSESSMENT       Suicide: Low       Homicide: Low       Self-Harm: Low       Relapse: Low       Crisis Safety Plan Reviewed Not Indicated    ASSESSMENT/PLAN  Problem List Items Addressed This Visit    None  Visit Diagnoses         Unspecified mood (affective) disorder (HCC)    -  Primary           Problem type: Chronic Illness with exacerbation, progression, or side effects of treatment     Assessment: Tonya Hillman is a 39 y.o. old person with a history of ADHD, bipolar I disorder, postpartum psychosis, substance induced psychosis, generalized anxiety disorder, vitamin D deficiency, cannabis use.     History consistent most likely with bipolar spectrum disorder. History of several admissions for substance induced psychosis and theresa relating to cannabis and LSD. Per chart review, she had theresa symptoms after being started on atomoxetine and Adderall in the past. Noted psychosis during post partum period without any concurrent substance use.     Recently discharged from [facility] program in [place] where she was also diagnosed with bipolar disorder. They also did not feel she met criteria for ADHD. She was trialed on lithium with intolerable side effects. She was then started on lamotrigine which she also notes intolerable side effects. She " is interested in switching back to lurasidone as this was helpful for her mood in the past. She was started on metformin for prevention of metabolic adverse effects, and she would like to continue current dose for now. No current symptoms significant for theresa, sleeping well, denies SI. Will have her complete mood tracker.     Dx:  Unspecified mood disorder  - rule out bipolar I disorder (favored)  - substance induced psychosis  History of generalized anxiety disorder  History of ADHD (less likely)  History of postpartum psychosis    Plan  - complete mood tracker    Medication:    - taper lamotrigine to 25 mg daily for 1 week and then discontinue.   - after lamotrigine is discontinued, start lurasidone 20 mg PO daily (take with 500 calorie meal, avoid grape fruit juice)   - continue gabapentin 300 mg PO BID PRN for anxiety and sleep   - continue hydroxyzine 25 mg qhs prn for acute anxiety/sleep   - continue metformin 250 mg daily for prevention of metabolic side effect (consider switch to ER)    Therapy: next visit, ask if patient still in therapy      Other Orders: none    Medication options, alternatives (including no medications) and medication risks/benefits/side effects were discussed in detail.  The patient was advised to call, message clinician on [facility], or come in to the clinic if symptoms worsen or if questions/issues regarding their medications arise.  The patient verbalized understanding and agreement.    The patient was educated to call 911, call the suicide hotline, or go to the local ER if having thoughts of suicide or homicide.  The patient verbalized understanding and agreement.   The proposed treatment plan was discussed with the patient who was provided the opportunity to ask questions and make suggestions regarding alternative treatment. Patient verbalized understanding and expressed agreement with the plan.      Return in about 4 weeks (around 8/22/2025).      This appointment was supervised by  attending psychiatrist, Yadi Matthew M.D. , who agrees with assessment and treatment plan.  See attending attestation for more details.            [1]   Current Outpatient Medications:     gabapentin (NEURONTIN) 600 MG tablet, Take 1/2 tab (300 mg) to 1 tab (600 mg) by mouth up to three times daily as needed for anxiety and sleep, Disp: 60 Tablet, Rfl: 0    ondansetron (ZOFRAN ODT) 4 MG TABLET DISPERSIBLE, Take 1 Tablet by mouth every 6 hours as needed for Nausea/Vomiting., Disp: 10 Tablet, Rfl: 0    albuterol 108 (90 Base) MCG/ACT Aero Soln inhalation aerosol, Inhale 2 Puffs every four hours as needed for Shortness of Breath., Disp: 1 Each, Rfl: 0    tretinoin (RETIN-A) 0.1 % cream, APPLY A THIN LAYER TO THE AFFECTED AREA(S) NIGHTLY AT BEDTIME. START WITH EVERY OTHER NIGHT AND INCREASE TO NIGHTLY AS TOLERATED, Disp: , Rfl:     Levonorgestrel (LEWIS) 13.5 MG IUD, by Intrauterine route., Disp: , Rfl:   [2]   Past Medical History:  Diagnosis Date    ADHD     Anxiety     PTSD (post-traumatic stress disorder)    [3] No Known Allergies  [4]   Past Surgical History:  Procedure Laterality Date    BREAST RECONSTRUCTION Bilateral 02/14/2019    Breast augmentation   [5]   Social History  Socioeconomic History    Marital status:    Tobacco Use    Smoking status: Every Day     Current packs/day: 0.50     Types: Cigarettes    Smokeless tobacco: Never   Vaping Use    Vaping status: Every Day    Substances: Nicotine    Devices: Disposable   Substance and Sexual Activity    Alcohol use: Not Currently    Drug use: Not Currently     Types: Inhaled     Comment: Marijuana occasional    Sexual activity: Yes     Partners: Male     Birth control/protection: I.U.D.   [6]   Past Surgical History:  Procedure Laterality Date    BREAST RECONSTRUCTION Bilateral 02/14/2019    Breast augmentation

## 2025-07-25 ENCOUNTER — OFFICE VISIT (OUTPATIENT)
Dept: BEHAVIORAL HEALTH | Facility: PSYCHIATRIC FACILITY | Age: 40
End: 2025-07-25
Payer: COMMERCIAL

## 2025-07-25 VITALS
HEART RATE: 54 BPM | HEIGHT: 64 IN | DIASTOLIC BLOOD PRESSURE: 66 MMHG | OXYGEN SATURATION: 96 % | WEIGHT: 121 LBS | BODY MASS INDEX: 20.66 KG/M2 | SYSTOLIC BLOOD PRESSURE: 99 MMHG

## 2025-07-25 DIAGNOSIS — Z79.899 ENCOUNTER FOR LONG-TERM (CURRENT) USE OF MEDICATIONS: ICD-10-CM

## 2025-07-25 DIAGNOSIS — F39 UNSPECIFIED MOOD (AFFECTIVE) DISORDER (HCC): Primary | ICD-10-CM

## 2025-07-25 PROCEDURE — 99999 PR NO CHARGE: CPT

## 2025-07-25 RX ORDER — LURASIDONE HYDROCHLORIDE 20 MG/1
20 TABLET, FILM COATED ORAL
Qty: 30 TABLET | Refills: 2 | Status: SHIPPED | OUTPATIENT
Start: 2025-07-25

## 2025-07-25 RX ORDER — GABAPENTIN 600 MG/1
TABLET ORAL
Qty: 60 TABLET | Refills: 0 | Status: SHIPPED | OUTPATIENT
Start: 2025-07-25

## 2025-07-25 ASSESSMENT — FIBROSIS 4 INDEX: FIB4 SCORE: 0.69

## 2025-08-13 DIAGNOSIS — F39 UNSPECIFIED MOOD (AFFECTIVE) DISORDER (HCC): ICD-10-CM

## 2025-08-13 RX ORDER — GABAPENTIN 600 MG/1
TABLET ORAL
Qty: 60 TABLET | Refills: 0 | Status: SHIPPED | OUTPATIENT
Start: 2025-08-13

## 2025-08-22 ENCOUNTER — OFFICE VISIT (OUTPATIENT)
Dept: BEHAVIORAL HEALTH | Facility: PSYCHIATRIC FACILITY | Age: 40
End: 2025-08-22
Payer: COMMERCIAL

## 2025-08-22 VITALS
BODY MASS INDEX: 21.17 KG/M2 | HEIGHT: 64 IN | DIASTOLIC BLOOD PRESSURE: 65 MMHG | HEART RATE: 55 BPM | SYSTOLIC BLOOD PRESSURE: 108 MMHG | OXYGEN SATURATION: 98 % | WEIGHT: 124 LBS

## 2025-08-22 DIAGNOSIS — F39 UNSPECIFIED MOOD (AFFECTIVE) DISORDER (HCC): ICD-10-CM

## 2025-08-22 DIAGNOSIS — Z79.899 ENCOUNTER FOR LONG-TERM (CURRENT) USE OF MEDICATIONS: Primary | ICD-10-CM

## 2025-08-22 RX ORDER — LURASIDONE HYDROCHLORIDE 20 MG/1
40 TABLET, FILM COATED ORAL
Qty: 60 TABLET | Refills: 2 | Status: SHIPPED | OUTPATIENT
Start: 2025-08-22 | End: 2025-08-26

## 2025-08-22 RX ORDER — METFORMIN HYDROCHLORIDE 500 MG/1
500 TABLET, EXTENDED RELEASE ORAL DAILY
Qty: 100 TABLET | Refills: 3 | Status: SHIPPED | OUTPATIENT
Start: 2025-08-22 | End: 2026-09-26

## 2025-08-22 ASSESSMENT — FIBROSIS 4 INDEX: FIB4 SCORE: 0.71

## 2025-08-26 DIAGNOSIS — F39 UNSPECIFIED MOOD (AFFECTIVE) DISORDER (HCC): ICD-10-CM

## 2025-08-26 RX ORDER — LURASIDONE HYDROCHLORIDE 40 MG/1
40 TABLET, FILM COATED ORAL
Qty: 30 TABLET | Refills: 2 | Status: SHIPPED | OUTPATIENT
Start: 2025-08-26

## 2025-08-26 RX ORDER — LURASIDONE HYDROCHLORIDE 40 MG/1
40 TABLET, FILM COATED ORAL
Qty: 30 TABLET | Refills: 2 | Status: SHIPPED | OUTPATIENT
Start: 2025-08-26 | End: 2025-08-26 | Stop reason: SDUPTHER